# Patient Record
Sex: FEMALE | Race: WHITE | NOT HISPANIC OR LATINO
[De-identification: names, ages, dates, MRNs, and addresses within clinical notes are randomized per-mention and may not be internally consistent; named-entity substitution may affect disease eponyms.]

---

## 2018-12-04 ENCOUNTER — RESULT REVIEW (OUTPATIENT)
Age: 73
End: 2018-12-04

## 2018-12-10 ENCOUNTER — RESULT REVIEW (OUTPATIENT)
Age: 73
End: 2018-12-10

## 2018-12-14 ENCOUNTER — RECORD ABSTRACTING (OUTPATIENT)
Age: 73
End: 2018-12-14

## 2018-12-14 DIAGNOSIS — Z96.653 PRESENCE OF ARTIFICIAL KNEE JOINT, BILATERAL: ICD-10-CM

## 2018-12-14 DIAGNOSIS — Z83.49 FAMILY HISTORY OF OTHER ENDOCRINE, NUTRITIONAL AND METABOLIC DISEASES: ICD-10-CM

## 2018-12-14 DIAGNOSIS — F32.9 MAJOR DEPRESSIVE DISORDER, SINGLE EPISODE, UNSPECIFIED: ICD-10-CM

## 2018-12-14 DIAGNOSIS — Z86.19 PERSONAL HISTORY OF OTHER INFECTIOUS AND PARASITIC DISEASES: ICD-10-CM

## 2018-12-14 DIAGNOSIS — M19.041 PRIMARY OSTEOARTHRITIS, RIGHT HAND: ICD-10-CM

## 2018-12-14 DIAGNOSIS — R73.03 PREDIABETES.: ICD-10-CM

## 2018-12-14 DIAGNOSIS — Z87.898 PERSONAL HISTORY OF OTHER SPECIFIED CONDITIONS: ICD-10-CM

## 2018-12-14 DIAGNOSIS — Z81.8 FAMILY HISTORY OF OTHER MENTAL AND BEHAVIORAL DISORDERS: ICD-10-CM

## 2018-12-14 DIAGNOSIS — Z82.49 FAMILY HISTORY OF ISCHEMIC HEART DISEASE AND OTHER DISEASES OF THE CIRCULATORY SYSTEM: ICD-10-CM

## 2018-12-14 DIAGNOSIS — Z85.038 PERSONAL HISTORY OF OTHER MALIGNANT NEOPLASM OF LARGE INTESTINE: ICD-10-CM

## 2018-12-14 DIAGNOSIS — R25.2 CRAMP AND SPASM: ICD-10-CM

## 2018-12-14 DIAGNOSIS — M19.042 PRIMARY OSTEOARTHRITIS, LEFT HAND: ICD-10-CM

## 2018-12-14 DIAGNOSIS — Z84.89 FAMILY HISTORY OF OTHER SPECIFIED CONDITIONS: ICD-10-CM

## 2018-12-14 DIAGNOSIS — Z87.828 PERSONAL HISTORY OF OTHER (HEALED) PHYSICAL INJURY AND TRAUMA: ICD-10-CM

## 2018-12-14 DIAGNOSIS — Z82.0 FAMILY HISTORY OF EPILEPSY AND OTHER DISEASES OF THE NERVOUS SYSTEM: ICD-10-CM

## 2018-12-14 DIAGNOSIS — R68.89 OTHER GENERAL SYMPTOMS AND SIGNS: ICD-10-CM

## 2018-12-14 DIAGNOSIS — Z83.3 FAMILY HISTORY OF DIABETES MELLITUS: ICD-10-CM

## 2018-12-19 ENCOUNTER — APPOINTMENT (OUTPATIENT)
Dept: GASTROENTEROLOGY | Facility: HOSPITAL | Age: 73
End: 2018-12-19

## 2018-12-31 ENCOUNTER — RX RENEWAL (OUTPATIENT)
Age: 73
End: 2018-12-31

## 2019-01-09 ENCOUNTER — APPOINTMENT (OUTPATIENT)
Dept: ENDOCRINOLOGY | Facility: CLINIC | Age: 74
End: 2019-01-09
Payer: MEDICARE

## 2019-01-09 PROCEDURE — 36415 COLL VENOUS BLD VENIPUNCTURE: CPT

## 2019-01-10 ENCOUNTER — APPOINTMENT (OUTPATIENT)
Dept: FAMILY MEDICINE | Facility: CLINIC | Age: 74
End: 2019-01-10
Payer: MEDICARE

## 2019-01-10 VITALS
DIASTOLIC BLOOD PRESSURE: 64 MMHG | WEIGHT: 209 LBS | HEIGHT: 62 IN | BODY MASS INDEX: 38.46 KG/M2 | SYSTOLIC BLOOD PRESSURE: 112 MMHG

## 2019-01-10 DIAGNOSIS — R32 UNSPECIFIED URINARY INCONTINENCE: ICD-10-CM

## 2019-01-10 PROCEDURE — 99214 OFFICE O/P EST MOD 30 MIN: CPT

## 2019-01-10 NOTE — PLAN
[FreeTextEntry1] : The patient will see an urogynecologist for her new onset Urinary incontinence\par NPH was r/o by her neurologist and CT head\par Her cough responding to Benzonatate which was renewed\par Recent Gastroscopy with Dr Lynn and management of GERD as per GI\par Found with biliary lithiasis which I think is asymptomatic

## 2019-01-10 NOTE — PHYSICAL EXAM
[Normal Sclera/Conjunctiva] : normal sclera/conjunctiva [EOMI] : extraocular movements intact [Normal Outer Ear/Nose] : the outer ears and nose were normal in appearance [No JVD] : no jugular venous distention

## 2019-01-10 NOTE — HISTORY OF PRESENT ILLNESS
[FreeTextEntry1] : Urinary incontinence\par GERD worsening\par Cough\par Biliary ilithiasis [de-identified] : Reports severe Urinary incontinence without any premonitory signs\par A CT of the head r/o NPH\par Recently seen By Dr Lynn, GI and  at [facility] for GERD and RUQ pain.\par He was found also with biliary lithiasis

## 2019-01-10 NOTE — REVIEW OF SYSTEMS
[Nasal Discharge] : nasal discharge [Palpitations] : palpitations [Cough] : cough [Heartburn] : heartburn [Incontinence] : incontinence [Joint Pain] : joint pain [Negative] : Eyes [Chest Pain] : no chest pain [Orthopnea] : no orthopnea

## 2019-01-10 NOTE — HEALTH RISK ASSESSMENT
[Intercurrent ED visits] : went to ED [No falls in past year] : Patient reported no falls in the past year [1] : 1) Little interest or pleasure doing things for several days (1) [0] : 2) Feeling down, depressed, or hopeless: Not at all (0) [] : No [de-identified] : [facility] [de-identified] : GI [[id number]] : 1

## 2019-01-18 ENCOUNTER — APPOINTMENT (OUTPATIENT)
Dept: ENDOCRINOLOGY | Facility: CLINIC | Age: 74
End: 2019-01-18
Payer: MEDICARE

## 2019-01-18 VITALS
HEART RATE: 84 BPM | BODY MASS INDEX: 38.28 KG/M2 | HEIGHT: 62 IN | SYSTOLIC BLOOD PRESSURE: 110 MMHG | DIASTOLIC BLOOD PRESSURE: 62 MMHG | WEIGHT: 208 LBS

## 2019-01-18 LAB
25(OH)D3 SERPL-MCNC: 35.4 NG/ML
ALBUMIN SERPL ELPH-MCNC: 4.3 G/DL
ALP BLD-CCNC: 135 U/L
ALT SERPL-CCNC: 10 U/L
ANION GAP SERPL CALC-SCNC: 12 MMOL/L
AST SERPL-CCNC: 16 U/L
BASOPHILS # BLD AUTO: 0.03 K/UL
BASOPHILS NFR BLD AUTO: 0.4 %
BILIRUB SERPL-MCNC: 0.3 MG/DL
BUN SERPL-MCNC: 20 MG/DL
CALCIUM SERPL-MCNC: 9.7 MG/DL
CHLORIDE SERPL-SCNC: 107 MMOL/L
CHOLEST SERPL-MCNC: 221 MG/DL
CHOLEST/HDLC SERPL: 2.9 RATIO
CO2 SERPL-SCNC: 25 MMOL/L
CREAT SERPL-MCNC: 1.32 MG/DL
EOSINOPHIL # BLD AUTO: 0.12 K/UL
EOSINOPHIL NFR BLD AUTO: 1.6 %
FERRITIN SERPL-MCNC: 61 NG/ML
GLUCOSE SERPL-MCNC: 109 MG/DL
HCT VFR BLD CALC: 41.5 %
HDLC SERPL-MCNC: 76 MG/DL
HGB BLD-MCNC: 12.6 G/DL
IMM GRANULOCYTES NFR BLD AUTO: 0.3 %
IRON SERPL-MCNC: 27 UG/DL
LDLC SERPL CALC-MCNC: 119 MG/DL
LYMPHOCYTES # BLD AUTO: 1.53 K/UL
LYMPHOCYTES NFR BLD AUTO: 19.8 %
MAN DIFF?: NORMAL
MCHC RBC-ENTMCNC: 27 PG
MCHC RBC-ENTMCNC: 30.4 GM/DL
MCV RBC AUTO: 88.9 FL
MONOCYTES # BLD AUTO: 0.61 K/UL
MONOCYTES NFR BLD AUTO: 7.9 %
NEUTROPHILS # BLD AUTO: 5.43 K/UL
NEUTROPHILS NFR BLD AUTO: 70 %
PLATELET # BLD AUTO: 183 K/UL
POTASSIUM SERPL-SCNC: 4.9 MMOL/L
PROT SERPL-MCNC: 6.9 G/DL
RBC # BLD: 4.67 M/UL
RBC # FLD: 14.1 %
SODIUM SERPL-SCNC: 144 MMOL/L
T4 FREE SERPL-MCNC: 1.2 NG/DL
TRIGL SERPL-MCNC: 131 MG/DL
TSH SERPL-ACNC: 6.35 UIU/ML
WBC # FLD AUTO: 7.74 K/UL

## 2019-01-18 PROCEDURE — 99213 OFFICE O/P EST LOW 20 MIN: CPT

## 2019-01-18 RX ORDER — DEXLANSOPRAZOLE 60 MG/1
60 CAPSULE, DELAYED RELEASE ORAL
Refills: 0 | Status: DISCONTINUED | COMMUNITY
End: 2019-01-18

## 2019-01-18 RX ORDER — HYDROXYCHLOROQUINE SULFATE 200 MG/1
200 TABLET, FILM COATED ORAL
Refills: 0 | Status: DISCONTINUED | COMMUNITY
End: 2019-01-18

## 2019-01-18 RX ORDER — BENZONATATE 100 MG/1
100 CAPSULE ORAL 3 TIMES DAILY
Qty: 21 | Refills: 0 | Status: DISCONTINUED | COMMUNITY
Start: 2019-01-10 | End: 2019-01-18

## 2019-01-31 ENCOUNTER — APPOINTMENT (OUTPATIENT)
Dept: RHEUMATOLOGY | Facility: CLINIC | Age: 74
End: 2019-01-31
Payer: MEDICARE

## 2019-01-31 VITALS
HEART RATE: 61 BPM | BODY MASS INDEX: 38.28 KG/M2 | HEIGHT: 62 IN | SYSTOLIC BLOOD PRESSURE: 114 MMHG | WEIGHT: 208 LBS | OXYGEN SATURATION: 97 % | DIASTOLIC BLOOD PRESSURE: 64 MMHG | RESPIRATION RATE: 16 BRPM

## 2019-01-31 DIAGNOSIS — I50.30 UNSPECIFIED DIASTOLIC (CONGESTIVE) HEART FAILURE: ICD-10-CM

## 2019-01-31 DIAGNOSIS — Z87.19 PERSONAL HISTORY OF OTHER DISEASES OF THE DIGESTIVE SYSTEM: ICD-10-CM

## 2019-01-31 PROCEDURE — 99214 OFFICE O/P EST MOD 30 MIN: CPT

## 2019-01-31 RX ORDER — RANITIDINE HYDROCHLORIDE 150 MG/1
150 TABLET, FILM COATED ORAL
Refills: 0 | Status: DISCONTINUED | COMMUNITY
End: 2019-01-31

## 2019-01-31 NOTE — PHYSICAL EXAM
[] : no respiratory distress [Auscultation Breath Sounds / Voice Sounds] : lungs were clear to auscultation bilaterally [Cervical Lymph Nodes Enlarged Posterior Bilaterally] : posterior cervical [Cervical Lymph Nodes Enlarged Anterior Bilaterally] : anterior cervical [Motor Exam] : the motor exam was normal [FreeTextEntry1] : No evidence of active synovitis at this time.

## 2019-01-31 NOTE — REVIEW OF SYSTEMS
[Fever] : no fever [Chills] : no chills [Eye Pain] : no eye pain [Red Eyes] : eyes not red [Shortness Of Breath] : no shortness of breath [Cough] : no cough [FreeTextEntry6] : No pleuritic C/P

## 2019-02-25 ENCOUNTER — APPOINTMENT (OUTPATIENT)
Dept: GASTROENTEROLOGY | Facility: CLINIC | Age: 74
End: 2019-02-25
Payer: MEDICARE

## 2019-02-25 VITALS
WEIGHT: 205 LBS | BODY MASS INDEX: 37.73 KG/M2 | DIASTOLIC BLOOD PRESSURE: 73 MMHG | SYSTOLIC BLOOD PRESSURE: 136 MMHG | HEIGHT: 62 IN

## 2019-02-25 PROCEDURE — 99214 OFFICE O/P EST MOD 30 MIN: CPT

## 2019-02-25 NOTE — ASSESSMENT
[FreeTextEntry1] : 1.  the symptoms, including the bitter lemy taste, worse upon lying down at night, and the water brash, all seem correlated with when Laisha went off Dexilant, and on Pantoprazole.\par 2.  I have explained that Dexilant in some patient s with reflux appears to be more successful in controlling the symptoms\par 3.  I have also noted that perhaps we can get things under control with Dexilant, 60 mg per day..and then we will change perhaps in the future to every other day\par 4.  for now however, this is about achieving good control\par 5. patient has strong fh of colon ca, and there is also fh os esophageal cancer [grandfather Dario]\par 6.  later in the year, we will be arranging colonos and egd..egd was in 2017, but the last colon was may 2018.\par 7rv in three months

## 2019-02-25 NOTE — HISTORY OF PRESENT ILLNESS
[FreeTextEntry1] : patient, who is well known to me from previous evals, has presented for recent onset some two months ago of a constant sensation of a bitter, sour, lemon like taste.\par seems to have lost her taste buds as well, all in th3e same time frame.\par no heartburn, no dysphagia.\par Please note; approx on year ago, last May, matthew t a year, I had discussed with the patient trying to wean down the Dexilant dose, although Dexilant had kept her reflux symptoms under superb control\par indeed, she never had this issue with her taste, and no problem with her taste buds.\par When she lies in bed she feels a type of water brash.\par \par patient about two mos ago, had gone completely off dexilant, and it was around then that she changed from Dexilant to Pantoprazole forty mg

## 2019-03-04 ENCOUNTER — RX RENEWAL (OUTPATIENT)
Age: 74
End: 2019-03-04

## 2019-03-13 NOTE — HISTORY OF PRESENT ILLNESS
[FreeTextEntry1] :  73 yo WW from [facility] coming for f/u hypothyroidism, strong family history of thyroid problems\par        hyperlipidemia, stopped Pravastatin as she had memory problems with it\par        seen Dr Norberto Reynolds for memory problems \par        also Dr Oneil for HTN\par        c/o weight gain and fatigue\par        likes cheese\par        fatigue started 3-6 months ago \par        9/16 had neck laminectomy, had infection and allergy to the suture material , has had multiple herniated disks\par        has been on thyroid supplement since her 20's , \par        Synthroid 112mcg TANNER qd labs reviewed normal TFTs\par        labs reviewed higher LDL \par        had thyroid US 1980's was fine, repeated 2011 small thyroid, no nodules\par        denies FHx thyroid cancer\par        had forehead radiation 1948 for a skin lesion\par        has on and off dysphagia\par        had EGD Dr Lynn \par        has no dysphonia\par        denies dyspnea\par        thyroid US 2011 small thyroid no nodules\par        labs reviewed high normal thyroid, finally low normal Vit D was low prior\par        had no CBCsince 4/17, does not want to have it today,, has h/o prior anemia per pt\par \par thyroid US 2011 MPRESSION: No focal nodules. Small heterogeneous thyroid gland similar to prior sonogram consistent with chronic thyroiditis.\par dexa scan 2011 normal \par has chronic fatigue sees also Cardiology

## 2019-03-13 NOTE — REVIEW OF SYSTEMS
[Fatigue] : fatigue [Recent Weight Loss (___ Lbs)] : recent [unfilled] ~Ulb weight loss [Joint Pain] : joint pain [Muscle Cramps] : muscle cramps [Myalgia] : myalgia  [Back Pain] : back pain [Depression] : depression [Anxiety] : anxiety [Negative] : Heme/Lymph [Recent Weight Gain (___ Lbs)] : no recent weight gain

## 2019-03-15 ENCOUNTER — RX RENEWAL (OUTPATIENT)
Age: 74
End: 2019-03-15

## 2019-04-01 ENCOUNTER — APPOINTMENT (OUTPATIENT)
Dept: CARDIOLOGY | Facility: CLINIC | Age: 74
End: 2019-04-01
Payer: MEDICARE

## 2019-04-01 ENCOUNTER — NON-APPOINTMENT (OUTPATIENT)
Age: 74
End: 2019-04-01

## 2019-04-01 VITALS
SYSTOLIC BLOOD PRESSURE: 140 MMHG | WEIGHT: 210 LBS | BODY MASS INDEX: 38.64 KG/M2 | HEART RATE: 55 BPM | DIASTOLIC BLOOD PRESSURE: 78 MMHG | HEIGHT: 62 IN

## 2019-04-01 PROCEDURE — 93000 ELECTROCARDIOGRAM COMPLETE: CPT

## 2019-04-01 PROCEDURE — 99214 OFFICE O/P EST MOD 30 MIN: CPT

## 2019-04-01 RX ORDER — ALBUTEROL SULFATE 90 UG/1
108 AEROSOL, METERED RESPIRATORY (INHALATION)
Refills: 0 | Status: DISCONTINUED | COMMUNITY
End: 2019-04-01

## 2019-04-01 RX ORDER — PANTOPRAZOLE 40 MG/1
40 TABLET, DELAYED RELEASE ORAL
Refills: 0 | Status: DISCONTINUED | COMMUNITY
End: 2019-04-01

## 2019-04-01 NOTE — REASON FOR VISIT
[FreeTextEntry1] : The patient was followed with principal diagnoses of hyperlipidemia, previous history of shortness of breath, nonobstructive CAD. She is scheduled for elective right hip replacement surgery with Dr. DURON  on April 23 at [facility].

## 2019-04-01 NOTE — HISTORY OF PRESENT ILLNESS
[FreeTextEntry1] : The patient has had right hip pain continued to her osteoarthritis. However there have been no acute cardiac symptoms. The patient has had no symptoms of chest pain shortness of breath dizziness lightheadedness or palpitations. She remains quite active but without formal exercise.

## 2019-04-01 NOTE — PHYSICAL EXAM
[General Appearance - Well Developed] : well developed [Normal Appearance] : normal appearance [Well Groomed] : well groomed [General Appearance - Well Nourished] : well nourished [No Deformities] : no deformities [General Appearance - In No Acute Distress] : no acute distress [Normal Conjunctiva] : the conjunctiva exhibited no abnormalities [Eyelids - No Xanthelasma] : the eyelids demonstrated no xanthelasmas [Normal Oral Mucosa] : normal oral mucosa [No Oral Pallor] : no oral pallor [No Oral Cyanosis] : no oral cyanosis [Normal Jugular Venous A Waves Present] : normal jugular venous A waves present [Normal Jugular Venous V Waves Present] : normal jugular venous V waves present [No Jugular Venous Duenas A Waves] : no jugular venous duenas A waves [Respiration, Rhythm And Depth] : normal respiratory rhythm and effort [Exaggerated Use Of Accessory Muscles For Inspiration] : no accessory muscle use [Auscultation Breath Sounds / Voice Sounds] : lungs were clear to auscultation bilaterally [Heart Rate And Rhythm] : heart rate and rhythm were normal [Heart Sounds] : normal S1 and S2 [Murmurs] : no murmurs present [Abdomen Soft] : soft [Abdomen Tenderness] : non-tender [Abdomen Mass (___ Cm)] : no abdominal mass palpated [Abnormal Walk] : normal gait [Gait - Sufficient For Exercise Testing] : the gait was sufficient for exercise testing [Nail Clubbing] : no clubbing of the fingernails [Cyanosis, Localized] : no localized cyanosis [Petechial Hemorrhages (___cm)] : no petechial hemorrhages [Skin Color & Pigmentation] : normal skin color and pigmentation [] : no rash [No Venous Stasis] : no venous stasis [Skin Lesions] : no skin lesions [No Skin Ulcers] : no skin ulcer [No Xanthoma] : no  xanthoma was observed [Oriented To Time, Place, And Person] : oriented to person, place, and time [Affect] : the affect was normal [Mood] : the mood was normal [No Anxiety] : not feeling anxious

## 2019-04-01 NOTE — DISCUSSION/SUMMARY
[FreeTextEntry1] : Patient is scheduled for elective hip replacement on April 23. Her cardiac examination and evaluation today is stable and satisfactory. The patient has had no recent cardiac symptoms. I see no reason that the scheduled surgery should not go forward as planned. The patient has been advised to take her medication especially her olmesartan on the morning of surgery so as to avoid any spike in blood pressure. I don't feel that any further cardiac tests are required at this time. The electrocardiogram is normal.

## 2019-04-08 ENCOUNTER — LABORATORY RESULT (OUTPATIENT)
Age: 74
End: 2019-04-08

## 2019-04-11 ENCOUNTER — APPOINTMENT (OUTPATIENT)
Dept: FAMILY MEDICINE | Facility: CLINIC | Age: 74
End: 2019-04-11
Payer: MEDICARE

## 2019-04-11 VITALS
SYSTOLIC BLOOD PRESSURE: 132 MMHG | HEART RATE: 56 BPM | DIASTOLIC BLOOD PRESSURE: 78 MMHG | BODY MASS INDEX: 38.64 KG/M2 | HEIGHT: 62 IN | RESPIRATION RATE: 14 BRPM | WEIGHT: 210 LBS

## 2019-04-11 DIAGNOSIS — Z01.818 ENCOUNTER FOR OTHER PREPROCEDURAL EXAMINATION: ICD-10-CM

## 2019-04-11 DIAGNOSIS — M16.11 UNILATERAL PRIMARY OSTEOARTHRITIS, RIGHT HIP: ICD-10-CM

## 2019-04-11 PROCEDURE — 99215 OFFICE O/P EST HI 40 MIN: CPT

## 2019-04-11 NOTE — PLAN
[FreeTextEntry1] : Pre-op investigation done at [facility] reviewed\par All the investigation are in normal range\par The patient will have cardiac clearance with Dr Oneil\par The patient is medically stable and cleared for surgery\par The patient is medically stable and cleared for surgery

## 2019-04-11 NOTE — PHYSICAL EXAM
[No Acute Distress] : no acute distress [Well Nourished] : well nourished [Well Developed] : well developed [Well-Appearing] : well-appearing [Normal Sclera/Conjunctiva] : normal sclera/conjunctiva [PERRL] : pupils equal round and reactive to light [EOMI] : extraocular movements intact [Normal Outer Ear/Nose] : the outer ears and nose were normal in appearance [Normal Oropharynx] : the oropharynx was normal [Supple] : supple [No JVD] : no jugular venous distention [No Lymphadenopathy] : no lymphadenopathy [Thyroid Normal, No Nodules] : the thyroid was normal and there were no nodules present [No Respiratory Distress] : no respiratory distress  [Clear to Auscultation] : lungs were clear to auscultation bilaterally [No Accessory Muscle Use] : no accessory muscle use [Normal Rate] : normal rate  [Regular Rhythm] : with a regular rhythm [Normal S1, S2] : normal S1 and S2 [No Abdominal Bruit] : a ~M bruit was not heard ~T in the abdomen [No Murmur] : no murmur heard [No Carotid Bruits] : no carotid bruits [Pedal Pulses Present] : the pedal pulses are present [No Varicosities] : no varicosities [No Edema] : there was no peripheral edema [No Extremity Clubbing/Cyanosis] : no extremity clubbing/cyanosis [No Palpable Aorta] : no palpable aorta [Soft] : abdomen soft [Non Tender] : non-tender [No Masses] : no abdominal mass palpated [Non-distended] : non-distended [Normal Bowel Sounds] : normal bowel sounds [No HSM] : no HSM [Normal Posterior Cervical Nodes] : no posterior cervical lymphadenopathy [Normal Anterior Cervical Nodes] : no anterior cervical lymphadenopathy [No CVA Tenderness] : no CVA  tenderness [No Joint Swelling] : no joint swelling [No Spinal Tenderness] : no spinal tenderness [Grossly Normal Strength/Tone] : grossly normal strength/tone [No Rash] : no rash [Normal Gait] : normal gait [Coordination Grossly Intact] : coordination grossly intact [No Focal Deficits] : no focal deficits [Deep Tendon Reflexes (DTR)] : deep tendon reflexes were 2+ and symmetric [Normal Affect] : the affect was normal [Normal Insight/Judgement] : insight and judgment were intact [de-identified] : P

## 2019-04-11 NOTE — HISTORY OF PRESENT ILLNESS
[Coronary Artery Disease] : coronary artery disease [Asthma] : asthma [No Adverse Anesthesia Reaction] : no adverse anesthesia reaction in self or family member [(Patient denies any chest pain, claudication, dyspnea on exertion, orthopnea, palpitations or syncope)] : Patient denies any chest pain, claudication, dyspnea on exertion, orthopnea, palpitations or syncope [Family Member] : no family member with adverse anesthesia reaction/sudden death [Self] : no previous adverse anesthesia reaction [Chronic Anticoagulation] : no chronic anticoagulation [Chronic Kidney Disease] : no chronic kidney disease [Diabetes] : no diabetes [FreeTextEntry1] : Right total hip replacement [FreeTextEntry2] : 04/23/2019 [FreeTextEntry3] : Dr Michael Bailey [FreeTextEntry4] : The patient is known with osteoarthritis\par scheduled for elective right hip replacement\par tolerated well previous anesthesia and surgery [FreeTextEntry7] : The patient will have cardiac clearance

## 2019-04-23 ENCOUNTER — APPOINTMENT (OUTPATIENT)
Dept: CARDIOLOGY | Facility: CLINIC | Age: 74
End: 2019-04-23

## 2019-05-28 ENCOUNTER — APPOINTMENT (OUTPATIENT)
Dept: ENDOCRINOLOGY | Facility: CLINIC | Age: 74
End: 2019-05-28
Payer: MEDICARE

## 2019-05-28 VITALS
WEIGHT: 207 LBS | HEIGHT: 62 IN | HEART RATE: 62 BPM | DIASTOLIC BLOOD PRESSURE: 72 MMHG | BODY MASS INDEX: 38.09 KG/M2 | SYSTOLIC BLOOD PRESSURE: 114 MMHG

## 2019-05-28 PROCEDURE — 99214 OFFICE O/P EST MOD 30 MIN: CPT

## 2019-05-28 NOTE — PHYSICAL EXAM
[Alert] : alert [No Acute Distress] : no acute distress [Well Nourished] : well nourished [Normal Sclera/Conjunctiva] : normal sclera/conjunctiva [Well Developed] : well developed [No Proptosis] : no proptosis [EOMI] : extra ocular movement intact [Thyroid Not Enlarged] : the thyroid was not enlarged [Normal Oropharynx] : the oropharynx was normal [No Thyroid Nodules] : there were no palpable thyroid nodules [No Respiratory Distress] : no respiratory distress [No Accessory Muscle Use] : no accessory muscle use [Clear to Auscultation] : lungs were clear to auscultation bilaterally [Normal Rate] : heart rate was normal  [Normal S1, S2] : normal S1 and S2 [Regular Rhythm] : with a regular rhythm [Pedal Pulses Normal] : the pedal pulses are present [No Edema] : there was no peripheral edema [Normal Bowel Sounds] : normal bowel sounds [Not Tender] : non-tender [Soft] : abdomen soft [Not Distended] : not distended [Post Cervical Nodes] : posterior cervical nodes [Anterior Cervical Nodes] : anterior cervical nodes [Axillary Nodes] : axillary nodes [No Spinal Tenderness] : no spinal tenderness [Normal] : normal and non tender [Spine Straight] : spine straight [No Stigmata of Cushings Syndrome] : no stigmata of cushings syndrome [Normal Gait] : normal gait [Normal Strength/Tone] : muscle strength and tone were normal [No Rash] : no rash [Acanthosis Nigricans] : no acanthosis nigricans [No Tremors] : no tremors [Normal Reflexes] : deep tendon reflexes were 2+ and symmetric [Oriented x3] : oriented to person, place, and time

## 2019-05-28 NOTE — HISTORY OF PRESENT ILLNESS
[FreeTextEntry1] :  73 yo WW from [facility] coming for f/u hypothyroidism, strong family history of thyroid problems\par        hyperlipidemia, stopped Pravastatin as she had memory problems with it\par        seen Dr Norberto Reynolds for memory problems \par        also Dr Oneil for HTN\par        feels better since started gardening \par no  more fatigue and lost 3lb since last visit \par recent right hip replacement feels better can move more \par        9/16 had neck laminectomy, had infection and allergy to the suture material , has had multiple herniated disks\par        has been on thyroid supplement since her 20's , \par        Synthroid 112mcg TANNER qd labs reviewed normal TFTs\par        labs reviewed higher LDL \par        had thyroid US 1980's was fine, repeated 2011 small thyroid, no nodules\par        denies FHx thyroid cancer\par        had forehead radiation 1948 for a skin lesion\par        has on and off dysphagia\par        had EGD Dr Lynn \par        has no dysphonia\par        denies dyspnea\par        thyroid US 2011 small thyroid no nodules\par        labs reviewed high normal thyroid, finally low normal Vit D was low prior\par        had no CBCsince 4/17, does not want to have it today,, has h/o prior anemia per pt\par \par thyroid US 2011 MPRESSION: No focal nodules. Small heterogeneous thyroid gland similar to prior sonogram consistent with chronic thyroiditis.\par dexa scan 2011 normal \par has chronic fatigue sees also Cardiology

## 2019-05-28 NOTE — REVIEW OF SYSTEMS
[Fatigue] : no fatigue [Recent Weight Gain (___ Lbs)] : no recent weight gain [Recent Weight Loss (___ Lbs)] : recent [unfilled] ~Ulb weight loss [Joint Pain] : joint pain [Muscle Cramps] : muscle cramps [Myalgia] : myalgia  [Back Pain] : back pain [Depression] : depression [Anxiety] : anxiety [Negative] : Heme/Lymph

## 2019-05-30 ENCOUNTER — APPOINTMENT (OUTPATIENT)
Dept: GASTROENTEROLOGY | Facility: CLINIC | Age: 74
End: 2019-05-30
Payer: MEDICARE

## 2019-05-30 VITALS
SYSTOLIC BLOOD PRESSURE: 160 MMHG | HEART RATE: 69 BPM | HEIGHT: 62 IN | DIASTOLIC BLOOD PRESSURE: 81 MMHG | WEIGHT: 206 LBS | BODY MASS INDEX: 37.91 KG/M2

## 2019-05-30 PROCEDURE — 99214 OFFICE O/P EST MOD 30 MIN: CPT

## 2019-05-30 NOTE — HISTORY OF PRESENT ILLNESS
[FreeTextEntry1] : Problems #1 intractable esophageal reflux.\par \par The patient is having what we could not describe as intractable reflux. She continues yareli her regimen of Paxil and 60 mg per day which she has been on for at least five years.\par \par in spite of this treatment, she continues to be highly symptomatic.\par \par there is virtually constant regurgitation, generally after she eats or drinks something..\par it does not matter what she consumes..\par even when she is sitting or walking, she feels the regurgitation.\par \par for instance, today, even when she came to office appointment, she drank bottle of water on her way over, then got out of the car, and regurgitated it up four times.\par \par doesn’t get much over night.\par \par upon bending over, not a problem\par \par her granfather had ca of esophagus, in seventies.\par \par \par after the last egd, she ended up with 'sores' on back of her rebecca, soon afterwards\par \par perhaps some of the symptamatology is belching..\par interestingly, while there is frequent regurg., the patient does not feel true heartburn\par \par also, patient has had hip replacement one month ago\par \par

## 2019-05-30 NOTE — ASSESSMENT
[FreeTextEntry1] : 1.  patient continues to be very symptomatic from regurgitation\par \par 2.  what is somewhat unusual is the absence of true heartburn,; there is regurgit, but not really discomfort, not really burning, not really positional, not waking her out of sleep, and not actually precipitated by any particular foods\par \par 3.  there has been a loss of 'taste buds' which she attributes to the time of her last procedure\par \par 4.  for sure, with the atypical nature of Cecelias symptoms, and I must reiterate, they are not completely typical, , and not at all food assoiated, we need to be cautious before setting up surgery\par \par 5. we discussed having a Bravo test; the 24 hour test to monitor ph but the downside being it actually involves Endoscopy to pass the probe into the esophagus.\par \par 6.  we have decided to try adding Gaviscon, and I would take two Gaviscon with each meal, and even three..the best form of Gaviscon is the tablet..\par I actually advise whenever possible GAVISCON ADVANCE, WHICH REQUIREWS INTERNET PURCHASE FROM [employer], BUT HAS HIGHER CONTENT OF ALGINISIC ACID, AND IS CLEARLY SUPERIOR TO OUR GAVISCON\par 7.  small meals, also advised\par \par and rv in three months\par and take the ranitidine or famotidine or zantac or pepcid on a prn basis, with the Gaviscon\par \par More than 50% of the face to face time was devoted to counseling and /or coordination of care.  THis coordination of care may have included reviewing other medical notes and reports, and communicating with other health professionals\par

## 2019-07-19 ENCOUNTER — RX RENEWAL (OUTPATIENT)
Age: 74
End: 2019-07-19

## 2019-07-25 ENCOUNTER — APPOINTMENT (OUTPATIENT)
Dept: RHEUMATOLOGY | Facility: CLINIC | Age: 74
End: 2019-07-25

## 2019-09-12 ENCOUNTER — LABORATORY RESULT (OUTPATIENT)
Age: 74
End: 2019-09-12

## 2019-09-13 ENCOUNTER — APPOINTMENT (OUTPATIENT)
Dept: ENDOCRINOLOGY | Facility: CLINIC | Age: 74
End: 2019-09-13
Payer: MEDICARE

## 2019-09-13 VITALS
BODY MASS INDEX: 36.07 KG/M2 | HEART RATE: 92 BPM | SYSTOLIC BLOOD PRESSURE: 100 MMHG | WEIGHT: 196 LBS | RESPIRATION RATE: 16 BRPM | DIASTOLIC BLOOD PRESSURE: 62 MMHG | HEIGHT: 62 IN | OXYGEN SATURATION: 100 %

## 2019-09-13 PROCEDURE — 99214 OFFICE O/P EST MOD 30 MIN: CPT

## 2019-09-13 RX ORDER — HYDROXYCHLOROQUINE SULFATE 200 MG/1
200 TABLET, FILM COATED ORAL
Qty: 60 | Refills: 2 | Status: DISCONTINUED | COMMUNITY
Start: 2019-03-15 | End: 2019-09-13

## 2019-09-15 NOTE — PHYSICAL EXAM
[Alert] : alert [No Acute Distress] : no acute distress [Well Nourished] : well nourished [Well Developed] : well developed [Normal Sclera/Conjunctiva] : normal sclera/conjunctiva [No Proptosis] : no proptosis [EOMI] : extra ocular movement intact [Normal Oropharynx] : the oropharynx was normal [Thyroid Not Enlarged] : the thyroid was not enlarged [No Thyroid Nodules] : there were no palpable thyroid nodules [No Respiratory Distress] : no respiratory distress [No Accessory Muscle Use] : no accessory muscle use [Clear to Auscultation] : lungs were clear to auscultation bilaterally [Normal Rate] : heart rate was normal  [Normal S1, S2] : normal S1 and S2 [Pedal Pulses Normal] : the pedal pulses are present [Regular Rhythm] : with a regular rhythm [No Edema] : there was no peripheral edema [Normal Bowel Sounds] : normal bowel sounds [Not Tender] : non-tender [Not Distended] : not distended [Soft] : abdomen soft [Post Cervical Nodes] : posterior cervical nodes [Anterior Cervical Nodes] : anterior cervical nodes [Axillary Nodes] : axillary nodes [Normal] : normal and non tender [No Spinal Tenderness] : no spinal tenderness [Spine Straight] : spine straight [No Stigmata of Cushings Syndrome] : no stigmata of cushings syndrome [Normal Gait] : normal gait [Normal Strength/Tone] : muscle strength and tone were normal [Acanthosis Nigricans] : no acanthosis nigricans [No Rash] : no rash [Normal Reflexes] : deep tendon reflexes were 2+ and symmetric [No Tremors] : no tremors [Oriented x3] : oriented to person, place, and time

## 2019-09-15 NOTE — REVIEW OF SYSTEMS
[Fatigue] : no fatigue [Recent Weight Gain (___ Lbs)] : no recent weight gain [Recent Weight Loss (___ Lbs)] : recent [unfilled] ~Ulb weight loss [Muscle Cramps] : muscle cramps [Joint Pain] : joint pain [Back Pain] : back pain [Myalgia] : myalgia  [Depression] : depression [Anxiety] : anxiety [Negative] : Heme/Lymph

## 2019-09-26 ENCOUNTER — APPOINTMENT (OUTPATIENT)
Dept: OBGYN | Facility: CLINIC | Age: 74
End: 2019-09-26
Payer: MEDICARE

## 2019-10-08 PROBLEM — Z80.0 FAMILY HISTORY OF MALIGNANT NEOPLASM OF COLON: Status: ACTIVE | Noted: 2018-12-14

## 2019-10-10 ENCOUNTER — APPOINTMENT (OUTPATIENT)
Dept: OBGYN | Facility: CLINIC | Age: 74
End: 2019-10-10
Payer: MEDICARE

## 2019-10-10 VITALS
BODY MASS INDEX: 38.09 KG/M2 | DIASTOLIC BLOOD PRESSURE: 80 MMHG | WEIGHT: 194 LBS | HEIGHT: 60 IN | SYSTOLIC BLOOD PRESSURE: 118 MMHG

## 2019-10-10 DIAGNOSIS — Z80.0 FAMILY HISTORY OF MALIGNANT NEOPLASM OF DIGESTIVE ORGANS: ICD-10-CM

## 2019-10-10 PROCEDURE — G0101: CPT

## 2019-10-14 ENCOUNTER — APPOINTMENT (OUTPATIENT)
Dept: CARDIOLOGY | Facility: CLINIC | Age: 74
End: 2019-10-14
Payer: MEDICARE

## 2019-10-14 VITALS
BODY MASS INDEX: 37.69 KG/M2 | DIASTOLIC BLOOD PRESSURE: 80 MMHG | HEIGHT: 60 IN | WEIGHT: 192 LBS | SYSTOLIC BLOOD PRESSURE: 130 MMHG | HEART RATE: 66 BPM

## 2019-10-14 PROCEDURE — 99213 OFFICE O/P EST LOW 20 MIN: CPT

## 2019-10-14 PROCEDURE — 93000 ELECTROCARDIOGRAM COMPLETE: CPT

## 2019-10-14 NOTE — REASON FOR VISIT
[FreeTextEntry1] : Patient comes for followup today. She is followed in a preventive mode with a history of hypertension and hyperlipidemia. She\par Successful hip surgery on the right in May 23 of this year. The patient did well with no cardiac complications.

## 2019-10-14 NOTE — DISCUSSION/SUMMARY
[FreeTextEntry1] : Patient is doing well clinically. There have been no acute cardiac symptoms. Her examination is stable. Electrocardiogram is normal. Based on today's examination I find her cardiovascular status to be satisfactory. I am urging the patient now to begin a program of regular walking exercise. I believe now that she is able to do this with the repaired hip as well as her general condition. Current medications will be continued.The patient's main medications are on losartan 20 mg and rosuvastatin 5 mg. Should be noted that the patient decreased her dosage of her simvastatin 10 mg because she believed it was causing memory loss. This has resolved with the lower dose.

## 2019-10-14 NOTE — PHYSICAL EXAM
[Well Groomed] : well groomed [Normal Appearance] : normal appearance [General Appearance - Well Developed] : well developed [General Appearance - Well Nourished] : well nourished [No Deformities] : no deformities [Normal Conjunctiva] : the conjunctiva exhibited no abnormalities [Eyelids - No Xanthelasma] : the eyelids demonstrated no xanthelasmas [General Appearance - In No Acute Distress] : no acute distress [No Oral Cyanosis] : no oral cyanosis [No Oral Pallor] : no oral pallor [Normal Oral Mucosa] : normal oral mucosa [Normal Jugular Venous A Waves Present] : normal jugular venous A waves present [Normal Jugular Venous V Waves Present] : normal jugular venous V waves present [No Jugular Venous Duenas A Waves] : no jugular venous duenas A waves [Respiration, Rhythm And Depth] : normal respiratory rhythm and effort [Heart Rate And Rhythm] : heart rate and rhythm were normal [Exaggerated Use Of Accessory Muscles For Inspiration] : no accessory muscle use [Auscultation Breath Sounds / Voice Sounds] : lungs were clear to auscultation bilaterally [Heart Sounds] : normal S1 and S2 [Murmurs] : no murmurs present [Abdomen Tenderness] : non-tender [Abdomen Soft] : soft [Abdomen Mass (___ Cm)] : no abdominal mass palpated [Abnormal Walk] : normal gait [Nail Clubbing] : no clubbing of the fingernails [Gait - Sufficient For Exercise Testing] : the gait was sufficient for exercise testing [Cyanosis, Localized] : no localized cyanosis [Petechial Hemorrhages (___cm)] : no petechial hemorrhages [Skin Color & Pigmentation] : normal skin color and pigmentation [] : no rash [Skin Lesions] : no skin lesions [No Skin Ulcers] : no skin ulcer [No Venous Stasis] : no venous stasis [Oriented To Time, Place, And Person] : oriented to person, place, and time [No Xanthoma] : no  xanthoma was observed [Mood] : the mood was normal [Affect] : the affect was normal [No Anxiety] : not feeling anxious

## 2019-10-21 ENCOUNTER — APPOINTMENT (OUTPATIENT)
Dept: FAMILY MEDICINE | Facility: CLINIC | Age: 74
End: 2019-10-21
Payer: MEDICARE

## 2019-10-21 VITALS
DIASTOLIC BLOOD PRESSURE: 78 MMHG | RESPIRATION RATE: 16 BRPM | SYSTOLIC BLOOD PRESSURE: 142 MMHG | HEIGHT: 60 IN | OXYGEN SATURATION: 97 % | BODY MASS INDEX: 38.09 KG/M2 | WEIGHT: 194 LBS | HEART RATE: 61 BPM

## 2019-10-21 PROCEDURE — 99214 OFFICE O/P EST MOD 30 MIN: CPT

## 2019-10-21 NOTE — HEALTH RISK ASSESSMENT
[] : No [Yes] : Yes [Monthly or less (1 pt)] : Monthly or less (1 point) [No falls in past year] : Patient reported no falls in the past year [Audit-CScore] : 1 [0] : 2) Feeling down, depressed, or hopeless: Not at all (0) [[id number]] : 0

## 2019-10-21 NOTE — HISTORY OF PRESENT ILLNESS
[FreeTextEntry1] : Cognitive changes\par F/u chronic medical problrems [de-identified] : Started on Aricept\par Report improvement in cognitive status

## 2019-10-21 NOTE — PLAN
[FreeTextEntry1] : Overall chronic condition are stable\par Improved cognitive status (per patient)\par Will  need Blood work next visit\par recommendedto drink Alkaline water

## 2019-10-21 NOTE — REVIEW OF SYSTEMS
[Incontinence] : incontinence [Joint Pain] : joint pain [Joint Stiffness] : joint stiffness [Back Pain] : back pain [Memory Loss] : memory loss [Negative] : Psychiatric

## 2019-11-04 ENCOUNTER — RX RENEWAL (OUTPATIENT)
Age: 74
End: 2019-11-04

## 2019-12-09 ENCOUNTER — APPOINTMENT (OUTPATIENT)
Dept: THORACIC SURGERY | Facility: CLINIC | Age: 74
End: 2019-12-09
Payer: MEDICARE

## 2019-12-09 ENCOUNTER — RX RENEWAL (OUTPATIENT)
Age: 74
End: 2019-12-09

## 2019-12-09 PROCEDURE — 99203 OFFICE O/P NEW LOW 30 MIN: CPT

## 2019-12-09 NOTE — PHYSICAL EXAM
[General Appearance - Alert] : alert [Sclera] : the sclera and conjunctiva were normal [PERRL With Normal Accommodation] : pupils were equal in size, round, and reactive to light [General Appearance - In No Acute Distress] : in no acute distress [Outer Ear] : the ears and nose were normal in appearance [Extraocular Movements] : extraocular movements were intact [Neck Appearance] : the appearance of the neck was normal [Oropharynx] : the oropharynx was normal [Neck Cervical Mass (___cm)] : no neck mass was observed [Jugular Venous Distention Increased] : there was no jugular-venous distention [Thyroid Diffuse Enlargement] : the thyroid was not enlarged [Thyroid Nodule] : there were no palpable thyroid nodules [Heart Rate And Rhythm] : heart rate was normal and rhythm regular [Auscultation Breath Sounds / Voice Sounds] : lungs were clear to auscultation bilaterally [Heart Sounds Gallop] : no gallops [Heart Sounds] : normal S1 and S2 [Murmurs] : no murmurs [Heart Sounds Pericardial Friction Rub] : no pericardial rub [Examination Of The Chest] : the chest was normal in appearance [Chest Visual Inspection Thoracic Asymmetry] : no chest asymmetry [Bowel Sounds] : normal bowel sounds [Diminished Respiratory Excursion] : normal chest expansion [Abdomen Tenderness] : non-tender [Abdomen Soft] : soft [Abdomen Mass (___ Cm)] : no abdominal mass palpated [Cervical Lymph Nodes Enlarged Posterior Bilaterally] : posterior cervical [Cervical Lymph Nodes Enlarged Anterior Bilaterally] : anterior cervical [Femoral Lymph Nodes Enlarged Bilaterally] : femoral [Supraclavicular Lymph Nodes Enlarged Bilaterally] : supraclavicular [Axillary Lymph Nodes Enlarged Bilaterally] : axillary [No Spinal Tenderness] : no spinal tenderness [Inguinal Lymph Nodes Enlarged Bilaterally] : inguinal [No CVA Tenderness] : no ~M costovertebral angle tenderness [Abnormal Walk] : normal gait [Musculoskeletal - Swelling] : no joint swelling seen [Nail Clubbing] : no clubbing  or cyanosis of the fingernails [Motor Tone] : muscle strength and tone were normal [Skin Color & Pigmentation] : normal skin color and pigmentation [Skin Turgor] : normal skin turgor [No Focal Deficits] : no focal deficits [Sensation] : the sensory exam was normal to light touch and pinprick [] : no rash [Deep Tendon Reflexes (DTR)] : deep tendon reflexes were 2+ and symmetric [Impaired Insight] : insight and judgment were intact [Affect] : the affect was normal [Oriented To Time, Place, And Person] : oriented to person, place, and time

## 2019-12-10 NOTE — REVIEW OF SYSTEMS
[Negative] : Psychiatric [Shortness Of Breath] : no shortness of breath [Wheezing] : no wheezing [SOB on Exertion] : no shortness of breath during exertion [Cough] : no cough [Orthopnea] : no orthopnea [Abdominal Pain] : no abdominal pain [Constipation] : no constipation [Vomiting] : no vomiting [Heartburn] : no heartburn [Diarrhea] : no diarrhea [Melena] : no melena

## 2019-12-10 NOTE — HISTORY OF PRESENT ILLNESS
[FreeTextEntry1] : This is a 75 y/o F who presents to our office with cc of "acidic taste in her mouth" and persistent belching.  She states she has had this issue for several years and is followed by a gastroenterologist.  She currently takes Dexelent and zantac (every other day) and states they help these symptoms.  \par \par Patient denies epigastric pain, regurgitation, vomiting, dysphagia an all respiratory symptoms.\par \par

## 2019-12-10 NOTE — ASSESSMENT
[FreeTextEntry1] : 73 y/o F who presents with symptoms of belching and "acidic taste" for a second opinion regarding her GERD management.  At this time there is no indication for surgical intervention.  She has expressed that her symptoms are minimal and improve with medication.  She has been instructed to continue management from Dr. Lynn and follow up with our office should her symptoms worsen or if she has any questions going forward.

## 2019-12-16 ENCOUNTER — MEDICATION RENEWAL (OUTPATIENT)
Age: 74
End: 2019-12-16

## 2019-12-25 ENCOUNTER — RESULT REVIEW (OUTPATIENT)
Age: 74
End: 2019-12-25

## 2019-12-26 ENCOUNTER — APPOINTMENT (OUTPATIENT)
Dept: GASTROENTEROLOGY | Facility: HOSPITAL | Age: 74
End: 2019-12-26

## 2019-12-26 DIAGNOSIS — R10.10 UPPER ABDOMINAL PAIN, UNSPECIFIED: ICD-10-CM

## 2019-12-27 DIAGNOSIS — R10.9 UNSPECIFIED ABDOMINAL PAIN: ICD-10-CM

## 2019-12-27 PROBLEM — R10.10 UPPER ABDOMINAL PAIN OF UNKNOWN ETIOLOGY: Status: ACTIVE | Noted: 2019-12-27

## 2020-01-03 ENCOUNTER — RESULT REVIEW (OUTPATIENT)
Age: 75
End: 2020-01-03

## 2020-01-03 PROBLEM — R10.9 RIGHT SIDED ABDOMINAL PAIN: Status: ACTIVE | Noted: 2020-01-03

## 2020-01-13 ENCOUNTER — RX RENEWAL (OUTPATIENT)
Age: 75
End: 2020-01-13

## 2020-01-21 ENCOUNTER — APPOINTMENT (OUTPATIENT)
Dept: GASTROENTEROLOGY | Facility: CLINIC | Age: 75
End: 2020-01-21
Payer: MEDICARE

## 2020-01-21 VITALS
SYSTOLIC BLOOD PRESSURE: 144 MMHG | WEIGHT: 188 LBS | BODY MASS INDEX: 34.6 KG/M2 | DIASTOLIC BLOOD PRESSURE: 66 MMHG | HEIGHT: 62 IN

## 2020-01-21 PROCEDURE — 99214 OFFICE O/P EST MOD 30 MIN: CPT

## 2020-01-21 RX ORDER — RANITIDINE HYDROCHLORIDE 300 MG/1
300 TABLET, FILM COATED ORAL
Refills: 0 | Status: DISCONTINUED | COMMUNITY
End: 2020-01-21

## 2020-01-21 NOTE — ASSESSMENT
[FreeTextEntry1] : 1.  epigastric pain..resolved\par 2.  esophageal reflux symptoms; somewhat atypical, and indeed, is this sensation in the throat truly reflux, especially in view of the atypical features mentioned above\par \par 3.  patient is already on Dexilant, which is an extremely effective heartburn medication\par \par 4.  I  have already discussed with patient the use of Gaviscon advance...it has more alginate, a protein that develops into a foam when it hits gastric acid..\par \par 5.  use one or two tabs..chew and swallow\par More than 50% of the face to face time was devoted to counseling and /or coordination of care.  THis coordination of care may have included reviewing other medical notes and reports, and communicating with other health professionals\par \par rv in three months

## 2020-01-21 NOTE — HISTORY OF PRESENT ILLNESS
[FreeTextEntry1] : 1.  Esophageal reflux; acidic taste in her mouth, epigastric pain\par \par Note; the epigastric pain, constant, began while Stefano was on a cruise, rather abruptly\par and this helped preciptate a gi work up\par which included;\par egd, negat\par egd biopsies; negative\par and ultrasound of abdomen; suspicion of tiny gallstones, but non shadowing..so they could be tiny gall bladder polyps\par this would not constitue a good reason for taking out the gall bladder\par \par fortunately, the epigastric pain subsided\par \par patient now is back to the baseline, but the baseline always includes having this abnormal sensation in the mouth, of there being acid in the mouth, and Laisha refers to this as her reflux\par \par HOWEVER, SHE DOES NOT HAVE TRUE HEARTBURN, SHE DOES NOT FIND ANY SENSITIVITY TO TYPICAL ESOPHAGEAL IRRITANTS, ALTHOUGH PERHAPS THE SYMPTOMS COULD OCCUR WITH TOMATO SAUCE OR WITH CHOCOLATE.\par \par drinks about one or two cups of coffecc, black.\par occas citrus..which is not a problem\par

## 2020-03-02 ENCOUNTER — RX RENEWAL (OUTPATIENT)
Age: 75
End: 2020-03-02

## 2020-03-11 ENCOUNTER — APPOINTMENT (OUTPATIENT)
Dept: ENDOCRINOLOGY | Facility: CLINIC | Age: 75
End: 2020-03-11
Payer: MEDICARE

## 2020-03-11 VITALS
DIASTOLIC BLOOD PRESSURE: 70 MMHG | HEIGHT: 62 IN | BODY MASS INDEX: 34.23 KG/M2 | HEART RATE: 59 BPM | WEIGHT: 186 LBS | SYSTOLIC BLOOD PRESSURE: 120 MMHG | OXYGEN SATURATION: 99 %

## 2020-03-11 DIAGNOSIS — Z86.69 PERSONAL HISTORY OF OTHER DISEASES OF THE NERVOUS SYSTEM AND SENSE ORGANS: ICD-10-CM

## 2020-03-11 LAB
25(OH)D3 SERPL-MCNC: 38.8 NG/ML
CHOLEST SERPL-MCNC: 171 MG/DL
CHOLEST/HDLC SERPL: 2.1 RATIO
ESTIMATED AVERAGE GLUCOSE: 108 MG/DL
HBA1C MFR BLD HPLC: 5.4 %
HDLC SERPL-MCNC: 83 MG/DL
LDLC SERPL CALC-MCNC: 64 MG/DL
MAGNESIUM SERPL-MCNC: 2 MG/DL
T4 FREE SERPL-MCNC: 1.4 NG/DL
TRIGL SERPL-MCNC: 124 MG/DL
TSH SERPL-ACNC: 3.48 UIU/ML

## 2020-03-11 PROCEDURE — 99214 OFFICE O/P EST MOD 30 MIN: CPT | Mod: 25

## 2020-03-11 PROCEDURE — G0447 BEHAVIOR COUNSEL OBESITY 15M: CPT | Mod: 59

## 2020-03-11 RX ORDER — DEXLANSOPRAZOLE 60 MG/1
60 CAPSULE, DELAYED RELEASE ORAL DAILY
Qty: 90 | Refills: 0 | Status: DISCONTINUED | COMMUNITY
Start: 2019-02-25 | End: 2020-03-11

## 2020-03-11 NOTE — HISTORY OF PRESENT ILLNESS
[FreeTextEntry1] :  74 yo WW from [facility] coming for f/u hypothyroidism, strong family history of thyroid problems\par        hyperlipidemia, stopped Pravastatin as she had memory problems with it, still has it with Rosuvatstatin 5mg qhs \par lost weight due to heartburn sees GI recent EGD good per pt , feels better now sees Dr Lynn last 1/20\par        seen Dr Norberto Reynolds for memory problems \par        also Dr Oneil for HTN\par        feels better since started gardening \par no  more fatigue and lost 3lb since last visit \par recent right hip replacement feels better can move more \par        9/16 had neck laminectomy, had infection and allergy to the suture material , has had multiple herniated disks\par        has been on thyroid supplement since her 20's , \par        Synthroid 112mcg TANNER qd labs reviewed normal TFTs\par        labs reviewed good  \par        had thyroid US 1980's was fine, repeated 2011 small thyroid, no nodules\par        denies FHx thyroid cancer\par        had forehead radiation 1948 for a skin lesion\par        has on and off dysphagia\par        had EGD Dr Lynn \par        has no dysphonia\par        denies dyspnea\par        thyroid US 2011 small thyroid no nodules\par        labs reviewed high normal thyroid, finally low normal Vit D was low prior\par        had no CBCsince 4/17, does not want to have it today,, has h/o prior anemia per pt\par \par thyroid US 2011 MPRESSION: No focal nodules. Small heterogeneous thyroid gland similar to prior sonogram consistent with chronic thyroiditis.\par dexa scan 2011 normal \par has chronic fatigue sees also Cardiology

## 2020-03-11 NOTE — REVIEW OF SYSTEMS
[Recent Weight Loss (___ Lbs)] : recent [unfilled] ~Ulb weight loss [Joint Pain] : joint pain [Muscle Cramps] : muscle cramps [Myalgia] : myalgia  [Back Pain] : back pain [Depression] : depression [Anxiety] : anxiety [Negative] : Heme/Lymph [Fatigue] : no fatigue [Recent Weight Gain (___ Lbs)] : no recent weight gain

## 2020-05-21 ENCOUNTER — APPOINTMENT (OUTPATIENT)
Dept: GASTROENTEROLOGY | Facility: CLINIC | Age: 75
End: 2020-05-21
Payer: MEDICARE

## 2020-05-21 PROCEDURE — 99443: CPT | Mod: 95

## 2020-05-21 NOTE — ASSESSMENT
[FreeTextEntry1] : esophageal reflux, now actually getting better over the last month, or last few weeks\par \par it certainly appears to be reflux\par \par but she has gained weight recently which is a good sign\par \par she does not want to take gavison...\par \par plan;\par 1.  try pantoprazole 40 mg two times per day\par \par 2.  since she doesn’t want to get gaviscon advance,  but i am urging her to try it\par \par 3.  have patient return to the office three months..but call earlier and make phone visit if need be\par \par More than 50% of the face to face time was devoted to counseling and /or coordination of care.  THis coordination of care may have included reviewing other medical notes and reports, and communicating with other health professionals\par

## 2020-05-21 NOTE — REASON FOR VISIT
[Follow-Up: _____] : a [unfilled] follow-up visit [FreeTextEntry1] : telephone visit, for regurgitation, almost everything is regurgitated

## 2020-05-21 NOTE — HISTORY OF PRESENT ILLNESS
[FreeTextEntry1] : 1 reflux and regurgitation;\par got worse as of december, 2019, when she had normal egd\par \par it occurs virtually on a daily basis...\par \par she gets it any time, whether she eats or not..\par \par the regurgitation comes right back up to her throat..\par \par has lost twenty five pounds..\par \par no nocturnal symptoms.\par \par not much pain in upper abdomen\par \par no relationship to esophageal irritants...\par \par takes pantoprazole.\par \par uses gaviscon but it is locally purchased\par \par only when she thinks of it.\par \par no dysphagia\par \par the upper abdominal pain began when she was on a cruise.\par \par the pain is right under the ribs, mid epigastric\par \par appetite is variable, but now her weight is stabilizing..\par \par even without dieting, lost weight...but her weight, initially down forty pounds, now back up ten pounds\par \par if anything the reflux is decreasing somewhat..\par \par re citrus; she avoids red sauces, citrus, oj, avoids alcohol , quit smoking twenty years ago,  \par \par with bending over, it doesn’t seem to get worse\par \par

## 2020-05-31 ENCOUNTER — LABORATORY RESULT (OUTPATIENT)
Age: 75
End: 2020-05-31

## 2020-06-02 ENCOUNTER — APPOINTMENT (OUTPATIENT)
Dept: CARDIOLOGY | Facility: CLINIC | Age: 75
End: 2020-06-02

## 2020-06-10 ENCOUNTER — APPOINTMENT (OUTPATIENT)
Dept: ENDOCRINOLOGY | Facility: CLINIC | Age: 75
End: 2020-06-10
Payer: MEDICARE

## 2020-06-10 ENCOUNTER — APPOINTMENT (OUTPATIENT)
Dept: ENDOCRINOLOGY | Facility: CLINIC | Age: 75
End: 2020-06-10

## 2020-06-10 DIAGNOSIS — K80.20 CALCULUS OF GALLBLADDER W/OUT CHOLECYSTITIS W/OUT OBSTRUCTION: ICD-10-CM

## 2020-06-10 PROCEDURE — 99442: CPT | Mod: 95

## 2020-06-10 NOTE — REVIEW OF SYSTEMS
[Joint Pain] : joint pain [Myalgia] : myalgia  [Muscle Cramps] : muscle cramps [Back Pain] : back pain [Depression] : depression [Anxiety] : anxiety [Negative] : Heme/Lymph

## 2020-06-10 NOTE — HISTORY OF PRESENT ILLNESS
[Home] : at home, [unfilled] , at the time of the visit. [Medical Office: ([facility])___] : at the medical office located in  [FreeTextEntry1] :  76 yo WW from [facility] coming for f/u hypothyroidism, strong family history of thyroid problems\par on her way to Dr Gail Thornton \par        hyperlipidemia, stopped Pravastatin as she had memory problems with it, still has it with Rosuvatstatin 5mg qhs \par lost weight due to heartburn sees GI recent EGD good per pt , feels better now sees Dr Lynn last 1/20\par        seen Dr Norberto Reynolds for memory problems \par        also Dr Oneil for HTN\par        feels better since started gardening \par no  more fatigue and lost 3lb since last visit \par recent right hip replacement feels better can move more \par        9/16 had neck laminectomy, had infection and allergy to the suture material , has had multiple herniated disks\par        has been on thyroid supplement since her 20's , \par        Synthroid 112mcg TANNER qd labs reviewed normal TFTs\par        labs reviewed good  \par        had thyroid US 1980's was fine, repeated 2011 small thyroid, no nodules\par        denies FHx thyroid cancer\par        had forehead radiation 1948 for a skin lesion\par        has on and off dysphagia\par        had EGD Dr Lynn \par        has no dysphonia\par        denies dyspnea\par        thyroid US 2011 small thyroid no nodules\par        labs reviewed high normal thyroid, finally low normal Vit D was low prior\par        had no CBCsince 4/17, does not want to have it today,, has h/o prior anemia per pt\par \par thyroid US 2011 MPRESSION: No focal nodules. Small heterogeneous thyroid gland similar to prior sonogram consistent with chronic thyroiditis.\par dexa scan 2011 normal \par has chronic fatigue sees also Cardiology

## 2020-07-13 ENCOUNTER — RX RENEWAL (OUTPATIENT)
Age: 75
End: 2020-07-13

## 2020-08-17 ENCOUNTER — RX RENEWAL (OUTPATIENT)
Age: 75
End: 2020-08-17

## 2020-08-27 ENCOUNTER — RX RENEWAL (OUTPATIENT)
Age: 75
End: 2020-08-27

## 2020-09-08 ENCOUNTER — LABORATORY RESULT (OUTPATIENT)
Age: 75
End: 2020-09-08

## 2020-09-15 ENCOUNTER — APPOINTMENT (OUTPATIENT)
Dept: FAMILY MEDICINE | Facility: CLINIC | Age: 75
End: 2020-09-15
Payer: MEDICARE

## 2020-09-15 VITALS — SYSTOLIC BLOOD PRESSURE: 122 MMHG | DIASTOLIC BLOOD PRESSURE: 70 MMHG

## 2020-09-15 PROCEDURE — 99213 OFFICE O/P EST LOW 20 MIN: CPT | Mod: 25

## 2020-09-15 PROCEDURE — 90662 IIV NO PRSV INCREASED AG IM: CPT

## 2020-09-15 PROCEDURE — G0008: CPT

## 2020-09-15 NOTE — HISTORY OF PRESENT ILLNESS
[FreeTextEntry1] : GERD\par Need Flu shot [de-identified] : Reports worsening of her GERD in spite of taking PPI\par Need flu shot and Shingrix

## 2020-09-17 ENCOUNTER — APPOINTMENT (OUTPATIENT)
Dept: GASTROENTEROLOGY | Facility: CLINIC | Age: 75
End: 2020-09-17
Payer: MEDICARE

## 2020-09-17 PROCEDURE — 99442: CPT | Mod: 95

## 2020-09-17 NOTE — ASSESSMENT
[FreeTextEntry1] : esophageal reflux\par \par regurgitation\par \par she should eliminate vodka as a trial\par \par inconsistance food intolerance\par \par she needs to distribute calories from dinner to breakfast and lunch\par \par patient really needs to try to have a smaller \par \par she has gaviscon advance...she needs to chew and swalow..can use two at a time..\par \par small meals, multiple, not one meal per day\par \par don’t eat within three hours of bedtime\par \par decrease or eliminate vodka for six weeks.time\par \par More than 50% of the face to face time was devoted to counseling and /or coordination of care.  THis coordination of care may have included reviewing other medical notes and reports, and communicating with other health professionals\par \par perhaps she will eventually need anti reflux procedure\par

## 2020-09-17 NOTE — HISTORY OF PRESENT ILLNESS
[FreeTextEntry1] : complains of severe reflux, beclching all day, throat burning..\par \par any greasy food makes it worse\par \par she vomits up bile occasionally\par does not wake up at night, sleeps through the night, sleeps with head of bed elevated with multiple pillows\par \par uses wedge pillow\par avoids red sauce\par no problem with peppers onions, and she can eat onions\par \par caffeine, she avoids \par no use of alcohol\par \par couple glasses of vodka, several times per week..\par \par size of meals may have a role...but she says she is not a big eater.\par often skips lunch, coffee for breakfast\par \par

## 2020-09-21 LAB
CHOLEST SERPL-MCNC: 161 MG/DL
CHOLEST/HDLC SERPL: 2.2 RATIO
HDLC SERPL-MCNC: 74 MG/DL
LDLC SERPL CALC-MCNC: 66 MG/DL
T4 FREE SERPL-MCNC: 1.2 NG/DL
TRIGL SERPL-MCNC: 106 MG/DL
TSH SERPL-ACNC: 7.17 UIU/ML

## 2020-09-30 ENCOUNTER — RX RENEWAL (OUTPATIENT)
Age: 75
End: 2020-09-30

## 2020-10-13 ENCOUNTER — APPOINTMENT (OUTPATIENT)
Dept: OBGYN | Facility: CLINIC | Age: 75
End: 2020-10-13
Payer: MEDICARE

## 2020-10-13 VITALS
HEIGHT: 62 IN | DIASTOLIC BLOOD PRESSURE: 70 MMHG | BODY MASS INDEX: 33.13 KG/M2 | WEIGHT: 180 LBS | SYSTOLIC BLOOD PRESSURE: 130 MMHG

## 2020-10-13 PROCEDURE — G0101: CPT

## 2020-10-22 ENCOUNTER — LABORATORY RESULT (OUTPATIENT)
Age: 75
End: 2020-10-22

## 2020-11-04 ENCOUNTER — RESULT REVIEW (OUTPATIENT)
Age: 75
End: 2020-11-04

## 2020-11-13 ENCOUNTER — APPOINTMENT (OUTPATIENT)
Dept: ENDOCRINOLOGY | Facility: CLINIC | Age: 75
End: 2020-11-13
Payer: MEDICARE

## 2020-11-13 VITALS
WEIGHT: 178 LBS | HEART RATE: 60 BPM | TEMPERATURE: 98.5 F | BODY MASS INDEX: 32.76 KG/M2 | SYSTOLIC BLOOD PRESSURE: 124 MMHG | HEIGHT: 62 IN | OXYGEN SATURATION: 98 % | DIASTOLIC BLOOD PRESSURE: 68 MMHG

## 2020-11-13 LAB
ALBUMIN SERPL ELPH-MCNC: 4.5 G/DL
ALP BLD-CCNC: 131 U/L
ALT SERPL-CCNC: 12 U/L
ANION GAP SERPL CALC-SCNC: 15 MMOL/L
AST SERPL-CCNC: 16 U/L
BILIRUB SERPL-MCNC: 0.5 MG/DL
BUN SERPL-MCNC: 16 MG/DL
CALCIUM SERPL-MCNC: 9.5 MG/DL
CHLORIDE SERPL-SCNC: 104 MMOL/L
CHOLEST SERPL-MCNC: 172 MG/DL
CO2 SERPL-SCNC: 24 MMOL/L
CREAT SERPL-MCNC: 0.88 MG/DL
ESTIMATED AVERAGE GLUCOSE: 105 MG/DL
GGT SERPL-CCNC: 10 U/L
GLUCOSE SERPL-MCNC: 101 MG/DL
HBA1C MFR BLD HPLC: 5.3 %
HDLC SERPL-MCNC: 74 MG/DL
LDLC SERPL CALC-MCNC: 82 MG/DL
NONHDLC SERPL-MCNC: 98 MG/DL
POTASSIUM SERPL-SCNC: 4.2 MMOL/L
PROT SERPL-MCNC: 6.7 G/DL
SODIUM SERPL-SCNC: 142 MMOL/L
TRIGL SERPL-MCNC: 78 MG/DL
TSH SERPL-ACNC: 4.61 UIU/ML

## 2020-11-13 PROCEDURE — 36415 COLL VENOUS BLD VENIPUNCTURE: CPT

## 2020-11-13 PROCEDURE — 99215 OFFICE O/P EST HI 40 MIN: CPT | Mod: 25

## 2020-11-13 NOTE — REVIEW OF SYSTEMS
[Recent Weight Loss (___ Lbs)] : recent weight loss: [unfilled] lbs [Negative] : Heme/Lymph [de-identified] : memory problems

## 2020-11-13 NOTE — HISTORY OF PRESENT ILLNESS
[Calcium (dietary)] : dietary Calcium [Vitamin D (oral)] : Vitamin D orally [Disordered Eating] : history of disordered eating [Taking Steroids] : no history of taking steroids [Diabetes] : no history of diabetes [Kidney Stones] : no history of kidney stones [Sedentary] : no sedentary lifestyle [Current Smoking___(ppd)] : not currently smoking [Previous Fragility Fracture] : no previous fragility fracture [Regular Dental Follow-Up] : no regular dental follow-up [Family History of Hip Fracture] : no family history of hip fracture [FreeTextEntry1] :  76 yo WW from [facility] coming for f/u hypothyroidism, strong family history of thyroid problems\par on her way to Dr Gail Thornton \par        hyperlipidemia, stopped Pravastatin as she had memory problems with it, still has it with Rosuvatstatin 5mg qhs \par lost weight due to heartburn sees GI recent EGD good per pt , feels better now sees Dr Lynn last 1/20\par        seen Dr Norberto Reynolds for memory problems \par        also Dr Oneil for HTN\par        feels better since started gardening \par no  more fatigue and lost 3lb since last visit \par recent right hip replacement feels better can move more \par        9/16 had neck laminectomy, had infection and allergy to the suture material , has had multiple herniated disks\par        has been on thyroid supplement since her 20's , \par        Synthroid 112mcg TANNER qd labs reviewed normal TFTs\par        labs reviewed good  \par        had thyroid US 1980's was fine, repeated 2011 small thyroid, no nodules\par        denies FHx thyroid cancer\par        had forehead radiation 1948 for a skin lesion\par        has on and off dysphagia\par        had EGD Dr Lynn \par        has no dysphonia\par        denies dyspnea\par        thyroid US 2011 small thyroid no nodules\par        labs reviewed high normal thyroid, finally low normal Vit D was low prior\par        had no CBCsince 4/17, does not want to have it today,, has h/o prior anemia per pt\par \par thyroid US 2011 MPRESSION: No focal nodules. Small heterogeneous thyroid gland similar to prior sonogram consistent with chronic thyroiditis.\par dexa scan 2011 normal \par has chronic fatigue sees also Cardiology

## 2020-11-13 NOTE — HISTORY OF PRESENT ILLNESS
[Calcium (dietary)] : dietary Calcium [Vitamin D (oral)] : Vitamin D orally [Disordered Eating] : history of disordered eating [Taking Steroids] : no history of taking steroids [Diabetes] : no history of diabetes [Kidney Stones] : no history of kidney stones [Sedentary] : no sedentary lifestyle [Current Smoking___(ppd)] : not currently smoking [Previous Fragility Fracture] : no previous fragility fracture [Regular Dental Follow-Up] : no regular dental follow-up [Family History of Hip Fracture] : no family history of hip fracture [FreeTextEntry1] :  76 yo WW from [facility] coming for f/u hypothyroidism, strong family history of thyroid problems\par on her way to Dr Gail Thornton \par        hyperlipidemia, stopped Pravastatin as she had memory problems with it, still has it with Rosuvatstatin 5mg qhs \par lost weight due to heartburn sees GI recent EGD good per pt , feels better now sees Dr Lynn last 1/20\par        seen Dr Norberto Reynolds for memory problems \par        also Dr Oneil for HTN\par        feels better since started gardening \par no  more fatigue and lost 3lb since last visit \par recent right hip replacement feels better can move more \par        9/16 had neck laminectomy, had infection and allergy to the suture material , has had multiple herniated disks\par        has been on thyroid supplement since her 20's , \par        Synthroid 112mcg TANNER qd labs reviewed normal TFTs\par        labs reviewed good  \par        had thyroid US 1980's was fine, repeated 2011 small thyroid, no nodules\par        denies FHx thyroid cancer\par        had forehead radiation 1948 for a skin lesion\par        has on and off dysphagia\par        had EGD Dr Lynn \par        has no dysphonia\par        denies dyspnea\par        thyroid US 2011 small thyroid no nodules\par        labs reviewed high normal thyroid, finally low normal Vit D was low prior\par        had no CBCsince 4/17, does not want to have it today,, has h/o prior anemia per pt\par \par thyroid US 2011 MPRESSION: No focal nodules. Small heterogeneous thyroid gland similar to prior sonogram consistent with chronic thyroiditis.\par dexa scan 2011 normal \par has chronic fatigue sees also Cardiology

## 2020-11-13 NOTE — REVIEW OF SYSTEMS
[Recent Weight Loss (___ Lbs)] : recent weight loss: [unfilled] lbs [Negative] : Heme/Lymph [de-identified] : memory problems

## 2020-11-16 LAB
25(OH)D3 SERPL-MCNC: 39.7 NG/ML
ALBUMIN SERPL ELPH-MCNC: 4.5 G/DL
ALP BLD-CCNC: 131 U/L
ALT SERPL-CCNC: 10 U/L
ANION GAP SERPL CALC-SCNC: 16 MMOL/L
AST SERPL-CCNC: 15 U/L
BILIRUB SERPL-MCNC: 0.3 MG/DL
BUN SERPL-MCNC: 14 MG/DL
CALCIUM SERPL-MCNC: 9.8 MG/DL
CALCIUM SERPL-MCNC: 9.8 MG/DL
CHLORIDE SERPL-SCNC: 103 MMOL/L
CO2 SERPL-SCNC: 23 MMOL/L
CREAT SERPL-MCNC: 0.92 MG/DL
GLUCOSE SERPL-MCNC: 107 MG/DL
MAGNESIUM SERPL-MCNC: 2 MG/DL
PARATHYROID HORMONE INTACT: 57 PG/ML
PHOSPHATE SERPL-MCNC: 3.4 MG/DL
POTASSIUM SERPL-SCNC: 4.6 MMOL/L
PROT SERPL-MCNC: 6.7 G/DL
SODIUM SERPL-SCNC: 142 MMOL/L

## 2020-11-17 LAB
ALBUMIN MFR SERPL ELPH: 60.2 %
ALBUMIN SERPL-MCNC: 4 G/DL
ALBUMIN/GLOB SERPL: 1.5 RATIO
ALPHA1 GLOB MFR SERPL ELPH: 4.7 %
ALPHA1 GLOB SERPL ELPH-MCNC: 0.3 G/DL
ALPHA2 GLOB MFR SERPL ELPH: 9.5 %
ALPHA2 GLOB SERPL ELPH-MCNC: 0.6 G/DL
B-GLOBULIN MFR SERPL ELPH: 11.6 %
B-GLOBULIN SERPL ELPH-MCNC: 0.8 G/DL
GAMMA GLOB FLD ELPH-MCNC: 0.9 G/DL
GAMMA GLOB MFR SERPL ELPH: 14 %
INTERPRETATION SERPL IEP-IMP: NORMAL
PROT SERPL-MCNC: 6.7 G/DL
PROT SERPL-MCNC: 6.7 G/DL

## 2020-11-23 ENCOUNTER — APPOINTMENT (OUTPATIENT)
Dept: GASTROENTEROLOGY | Facility: CLINIC | Age: 75
End: 2020-11-23
Payer: MEDICARE

## 2020-11-23 PROCEDURE — 99442: CPT | Mod: 95

## 2020-11-23 NOTE — HISTORY OF PRESENT ILLNESS
[FreeTextEntry1] : patient has turned positive re her reflux symptoms\par \par especially the last week\par \par she does not actually know what she has been using\par but it has been better\par \par Leonard her  is very supportive\par \par

## 2020-11-23 NOTE — ASSESSMENT
[FreeTextEntry1] : 1.  patient is doing better, but only recently\par \par 2.  the small meals, the pantoprazole bid for now, and liberal use of the gaviscon advance which she purchased on line\par \par 3.  whenever, there is increased symptamatology, i urge the patient to take extra dose of gaviscon advance, two tablets, as it has no significant adverse consequences\par \par 4.  repeat a telephonic eight weeks from now..\par \par More than 50% of the face to face time was devoted to counseling and /or coordination of care.  THis coordination of care may have included reviewing other medical notes and reports, and communicating with other health professionals\par

## 2020-11-29 ENCOUNTER — RESULT REVIEW (OUTPATIENT)
Age: 75
End: 2020-11-29

## 2020-12-08 ENCOUNTER — APPOINTMENT (OUTPATIENT)
Dept: FAMILY MEDICINE | Facility: CLINIC | Age: 75
End: 2020-12-08
Payer: MEDICARE

## 2020-12-08 VITALS
HEIGHT: 62 IN | HEART RATE: 78 BPM | WEIGHT: 177 LBS | DIASTOLIC BLOOD PRESSURE: 70 MMHG | BODY MASS INDEX: 32.57 KG/M2 | RESPIRATION RATE: 16 BRPM | OXYGEN SATURATION: 98 % | TEMPERATURE: 97.6 F | SYSTOLIC BLOOD PRESSURE: 136 MMHG

## 2020-12-08 PROCEDURE — 99214 OFFICE O/P EST MOD 30 MIN: CPT

## 2020-12-08 NOTE — ASSESSMENT
[FreeTextEntry1] : This patient is a 76 y/o F presenting with concerns of weight loss and memory loss. Our records indicate that she has not lost significant weight in the last year, however we will continue to observe her weight at future visits to make sure that there is no abnormal weight loss.\par \par Additionally, the patient believes that she is suffering from memory problems, however she has no signs of significant memory loss based off of her independent lifestyle and lack of problems associated with typical memory loss. She seems cognitively normal for her age in office today. She is understandably concerned given her family history of dementia and we will continue to monitor her mental function moving forward.

## 2020-12-08 NOTE — REVIEW OF SYSTEMS
[Recent Change In Weight] : ~T recent weight change [Memory Loss] : memory loss [Negative] : Heme/Lymph [Fever] : no fever [Chills] : no chills [Fatigue] : no fatigue [Hot Flashes] : no hot flashes [Night Sweats] : no night sweats [Headache] : no headache [Dizziness] : no dizziness [Fainting] : no fainting [Confusion] : no confusion [Unsteady Walking] : no ataxia

## 2020-12-08 NOTE — PLAN
[FreeTextEntry1] : - memory loss - continue to monitor at future visits bearing in mind patient's concerns and family history\par \par - weight loss - continue to monitor at follow up visits\par \par - make f/u appt for 3mos

## 2020-12-08 NOTE — HISTORY OF PRESENT ILLNESS
[FreeTextEntry1] : Patient presents with concerns of weight loss and memory loss\par F/u chronic medical problemss [de-identified] : 76 y/o F presenting with concerns of weight loss and memory loss. She reports that, according to her home scales, she has lost 40 lbs in the last 6 months; this is in contrast to our records that indicate she has not lost a significant amount of weight in this time. She does report that she has gone down a dress size however all of her clothes still fit. She reports no dietary changes, exercise changes, change in bowel habits, or abdominal pain. Patient also reports that her  believes she has memory loss. She is, however, still able to conduct all daily activities and lives an independent lifestyle; she drives herself without getting lost, she does her own finances, she does not leave appliances on by mistake around the house.

## 2020-12-08 NOTE — HEALTH RISK ASSESSMENT
[30 or more] : 30 or more [Yes] : Yes [2 - 3 times a week (3 pts)] : 2 - 3  times a week (3 points) [1 or 2 (0 pts)] : 1 or 2 (0 points) [Never (0 pts)] : Never (0 points) [No falls in past year] : Patient reported no falls in the past year [0] : 2) Feeling down, depressed, or hopeless: Not at all (0) [] : No [YearQuit] : 2016

## 2020-12-08 NOTE — PHYSICAL EXAM
[Normal] : normal gait, coordination grossly intact, no focal deficits and deep tendon reflexes were 2+ and symmetric [Speech Grossly Normal] : speech grossly normal [Memory Grossly Normal] : memory grossly normal [Normal Affect] : the affect was normal [Alert and Oriented x3] : oriented to person, place, and time [Normal Mood] : the mood was normal [Normal Insight/Judgement] : insight and judgment were intact

## 2020-12-16 ENCOUNTER — RX RENEWAL (OUTPATIENT)
Age: 75
End: 2020-12-16

## 2020-12-31 ENCOUNTER — APPOINTMENT (OUTPATIENT)
Dept: FAMILY MEDICINE | Facility: CLINIC | Age: 75
End: 2020-12-31
Payer: MEDICARE

## 2020-12-31 VITALS — TEMPERATURE: 98.1 F

## 2020-12-31 DIAGNOSIS — Z71.89 OTHER SPECIFIED COUNSELING: ICD-10-CM

## 2020-12-31 DIAGNOSIS — Z20.828 CONTACT WITH AND (SUSPECTED) EXPOSURE TO OTHER VIRAL COMMUNICABLE DISEASES: ICD-10-CM

## 2020-12-31 PROCEDURE — 99212 OFFICE O/P EST SF 10 MIN: CPT | Mod: CS,25

## 2021-01-04 LAB — SARS-COV-2 N GENE NPH QL NAA+PROBE: NOT DETECTED

## 2021-02-25 ENCOUNTER — APPOINTMENT (OUTPATIENT)
Dept: GASTROENTEROLOGY | Facility: CLINIC | Age: 76
End: 2021-02-25

## 2021-03-01 ENCOUNTER — APPOINTMENT (OUTPATIENT)
Dept: FAMILY MEDICINE | Facility: CLINIC | Age: 76
End: 2021-03-01
Payer: MEDICARE

## 2021-03-01 VITALS
SYSTOLIC BLOOD PRESSURE: 135 MMHG | BODY MASS INDEX: 32.2 KG/M2 | HEIGHT: 62 IN | TEMPERATURE: 98.1 F | RESPIRATION RATE: 16 BRPM | OXYGEN SATURATION: 98 % | HEART RATE: 77 BPM | DIASTOLIC BLOOD PRESSURE: 73 MMHG | WEIGHT: 175 LBS

## 2021-03-01 PROCEDURE — 99214 OFFICE O/P EST MOD 30 MIN: CPT

## 2021-03-01 NOTE — PLAN
[FreeTextEntry1] : Overall is doing well\par compliant with medication\par Renewed medication for cholesterol, low vitamin D and hypothyroidism\par F/U in 4 mo or PRN

## 2021-03-01 NOTE — HISTORY OF PRESENT ILLNESS
[FreeTextEntry1] : Patient presents with concerns of weight loss and memory loss\par F/u chronic medical problems\par Medication renewal [de-identified] : 77 y/o F presenting with concerns of weight loss and memory loss. She reports that, according to her home scales, she has lost 40 lbs in the last 6 months; this is in contrast to our records that indicate she has not lost a significant amount of weight in this time. She does report that she has gone down a dress size however all of her clothes still fit. She reports no dietary changes, exercise changes, change in bowel habits, or abdominal pain. Patient also reports that her  believes she has memory loss. She is, however, still able to conduct all daily activities and lives an independent lifestyle; she drives herself without getting lost, she does her own finances, she does not leave appliances on by mistake around the house.

## 2021-03-12 ENCOUNTER — RX RENEWAL (OUTPATIENT)
Age: 76
End: 2021-03-12

## 2021-03-13 ENCOUNTER — RX RENEWAL (OUTPATIENT)
Age: 76
End: 2021-03-13

## 2021-03-15 NOTE — REASON FOR VISIT
Statement Selected [FreeTextEntry1] : The patient was followed with principal diagnoses of hyperlipidemia, previous history of shortness of breath, nonobstructive CAD. She is scheduled for elective right hip replacement surgery with Dr. DURON  on April 23 at [facility].

## 2021-03-16 ENCOUNTER — RX RENEWAL (OUTPATIENT)
Age: 76
End: 2021-03-16

## 2021-03-16 LAB
25(OH)D3 SERPL-MCNC: 38.5 NG/ML
ALBUMIN SERPL ELPH-MCNC: 4.2 G/DL
ALP BLD-CCNC: 127 U/L
ALT SERPL-CCNC: 10 U/L
ANION GAP SERPL CALC-SCNC: 11 MMOL/L
AST SERPL-CCNC: 15 U/L
BILIRUB SERPL-MCNC: 0.5 MG/DL
BUN SERPL-MCNC: 12 MG/DL
CALCIUM SERPL-MCNC: 9.2 MG/DL
CALCIUM SERPL-MCNC: 9.2 MG/DL
CHLORIDE SERPL-SCNC: 107 MMOL/L
CO2 SERPL-SCNC: 25 MMOL/L
CREAT SERPL-MCNC: 0.84 MG/DL
GLUCOSE SERPL-MCNC: 103 MG/DL
PARATHYROID HORMONE INTACT: 87 PG/ML
POTASSIUM SERPL-SCNC: 4.1 MMOL/L
PROT SERPL-MCNC: 6.6 G/DL
SODIUM SERPL-SCNC: 143 MMOL/L
T4 FREE SERPL-MCNC: 1.4 NG/DL
TSH SERPL-ACNC: 2.13 UIU/ML

## 2021-03-19 ENCOUNTER — NON-APPOINTMENT (OUTPATIENT)
Age: 76
End: 2021-03-19

## 2021-03-19 ENCOUNTER — APPOINTMENT (OUTPATIENT)
Dept: ENDOCRINOLOGY | Facility: CLINIC | Age: 76
End: 2021-03-19
Payer: MEDICARE

## 2021-03-19 VITALS
OXYGEN SATURATION: 98 % | BODY MASS INDEX: 33.68 KG/M2 | HEIGHT: 62 IN | HEART RATE: 73 BPM | SYSTOLIC BLOOD PRESSURE: 120 MMHG | WEIGHT: 183 LBS | TEMPERATURE: 96.8 F | DIASTOLIC BLOOD PRESSURE: 70 MMHG

## 2021-03-19 PROCEDURE — G0447 BEHAVIOR COUNSEL OBESITY 15M: CPT | Mod: 59

## 2021-03-19 PROCEDURE — 99215 OFFICE O/P EST HI 40 MIN: CPT | Mod: 25

## 2021-03-19 RX ORDER — LEVOTHYROXINE SODIUM 112 UG/1
112 TABLET ORAL
Qty: 84 | Refills: 1 | Status: DISCONTINUED | COMMUNITY
Start: 2021-03-12 | End: 2021-03-19

## 2021-03-19 NOTE — REVIEW OF SYSTEMS
[Recent Weight Gain (___ Lbs)] : recent weight gain: [unfilled] lbs [Depression] : depression [Anxiety] : anxiety [Negative] : Heme/Lymph [de-identified] : memory problems

## 2021-03-19 NOTE — HISTORY OF PRESENT ILLNESS
[FreeTextEntry1] :  77 yo WW from [facility] coming for f/u hypothyroidism, osteoporosis strong family history of thyroid problems\par started to have serious memory problems on therapy with Dr Thornton \par also treated by  Dr Gail Thornton \par        hyperlipidemia, stopped Pravastatin as she had memory problems with it, still has it with Rosuvatstatin 5mg qhs memory problems are the same \par        also Dr Oneil for HTN\par        feels better since started gardening \par \par recent right hip replacement feels better can move more \par        9/16 had neck laminectomy, had infection and allergy to the suture material , has had multiple herniated disks\par        has been on thyroid supplement since her 20's , \par        Synthroid 137mcg TANNER qd labs reviewed normal TFTs\par        labs reviewed good  \par        had thyroid US 1980's was fine, repeated 2011 small thyroid, no nodules\par        denies FHx thyroid cancer\par        had forehead radiation 1948 for a skin lesion\par        has on and off dysphagia\par        had EGD Dr Lynn \par        has no dysphonia\par        denies dyspnea\par        thyroid US 2011 small thyroid no nodules\par        labs reviewed high normal thyroid, finally low normal Vit D was low prior\par \par \par thyroid US 2011 MPRESSION: No focal nodules. Small heterogeneous thyroid gland similar to prior sonogram consistent with chronic thyroiditis.\par dexa scan 2011 normal \par has chronic fatigue sees also Cardiology

## 2021-06-09 ENCOUNTER — RX RENEWAL (OUTPATIENT)
Age: 76
End: 2021-06-09

## 2021-07-01 ENCOUNTER — APPOINTMENT (OUTPATIENT)
Dept: FAMILY MEDICINE | Facility: CLINIC | Age: 76
End: 2021-07-01
Payer: MEDICARE

## 2021-07-01 VITALS
HEART RATE: 47 BPM | RESPIRATION RATE: 16 BRPM | DIASTOLIC BLOOD PRESSURE: 60 MMHG | SYSTOLIC BLOOD PRESSURE: 120 MMHG | HEIGHT: 62 IN | OXYGEN SATURATION: 98 % | TEMPERATURE: 98.6 F | BODY MASS INDEX: 33.68 KG/M2 | WEIGHT: 183 LBS

## 2021-07-01 DIAGNOSIS — R79.9 ABNORMAL FINDING OF BLOOD CHEMISTRY, UNSPECIFIED: ICD-10-CM

## 2021-07-01 PROCEDURE — 36415 COLL VENOUS BLD VENIPUNCTURE: CPT

## 2021-07-01 PROCEDURE — 99214 OFFICE O/P EST MOD 30 MIN: CPT | Mod: 25

## 2021-07-01 NOTE — HISTORY OF PRESENT ILLNESS
[FreeTextEntry1] : Patient presents with concerns of weight loss and memory loss\par F/u chronic medical problems\par Medication renewal [de-identified] : 75 y/o F presenting with concerns of weight loss and memory loss. She reports that, according to her home scales, she has lost 40 lbs in the last 6 months; this is in contrast to our records that indicate she has not lost a significant amount of weight in this time. She does report that she has gone down a dress size however all of her clothes still fit. She reports no dietary changes, exercise changes, change in bowel habits, or abdominal pain. Patient also reports that her  believes she has memory loss. She is, however, still able to conduct all daily activities and lives an independent lifestyle; she drives herself without getting lost, she does her own finances, she does not leave appliances on by mistake around the house.

## 2021-07-01 NOTE — PLAN
[FreeTextEntry1] : Passed cognitive screening for date, presidents calculus\par taking care of finances at home\par completed the covid vaccine\par return in 4-6 mo or as needed

## 2021-07-02 ENCOUNTER — APPOINTMENT (OUTPATIENT)
Dept: INTERNAL MEDICINE | Facility: CLINIC | Age: 76
End: 2021-07-02
Payer: MEDICARE

## 2021-07-02 DIAGNOSIS — R63.4 ABNORMAL WEIGHT LOSS: ICD-10-CM

## 2021-07-02 PROCEDURE — 36415 COLL VENOUS BLD VENIPUNCTURE: CPT

## 2021-07-06 ENCOUNTER — RX RENEWAL (OUTPATIENT)
Age: 76
End: 2021-07-06

## 2021-07-06 LAB
ALBUMIN SERPL ELPH-MCNC: 4.2 G/DL
ALP BLD-CCNC: 109 U/L
ALT SERPL-CCNC: 15 U/L
ANION GAP SERPL CALC-SCNC: 12 MMOL/L
AST SERPL-CCNC: 19 U/L
BASOPHILS # BLD AUTO: 0.05 K/UL
BASOPHILS NFR BLD AUTO: 1.1 %
BILIRUB SERPL-MCNC: 0.5 MG/DL
BUN SERPL-MCNC: 20 MG/DL
CALCIUM SERPL-MCNC: 9.3 MG/DL
CHLORIDE SERPL-SCNC: 106 MMOL/L
CHOLEST SERPL-MCNC: 163 MG/DL
CO2 SERPL-SCNC: 24 MMOL/L
CREAT SERPL-MCNC: 0.99 MG/DL
EOSINOPHIL # BLD AUTO: 0.06 K/UL
EOSINOPHIL NFR BLD AUTO: 1.3 %
ESTIMATED AVERAGE GLUCOSE: 103 MG/DL
GLUCOSE SERPL-MCNC: 94 MG/DL
HBA1C MFR BLD HPLC: 5.2 %
HCT VFR BLD CALC: 39.9 %
HDLC SERPL-MCNC: 67 MG/DL
HGB BLD-MCNC: 12.2 G/DL
IMM GRANULOCYTES NFR BLD AUTO: 0.2 %
LDLC SERPL CALC-MCNC: 81 MG/DL
LYMPHOCYTES # BLD AUTO: 1.4 K/UL
LYMPHOCYTES NFR BLD AUTO: 30.6 %
MAN DIFF?: NORMAL
MCHC RBC-ENTMCNC: 27.7 PG
MCHC RBC-ENTMCNC: 30.6 GM/DL
MCV RBC AUTO: 90.7 FL
MONOCYTES # BLD AUTO: 0.4 K/UL
MONOCYTES NFR BLD AUTO: 8.7 %
NEUTROPHILS # BLD AUTO: 2.66 K/UL
NEUTROPHILS NFR BLD AUTO: 58.1 %
NONHDLC SERPL-MCNC: 96 MG/DL
PLATELET # BLD AUTO: 28 K/UL
POTASSIUM SERPL-SCNC: 4.5 MMOL/L
PROT SERPL-MCNC: 6.2 G/DL
RBC # BLD: 4.4 M/UL
RBC # FLD: 13.8 %
SODIUM SERPL-SCNC: 142 MMOL/L
TRIGL SERPL-MCNC: 73 MG/DL
TSH SERPL-ACNC: 1.04 UIU/ML
VIT B12 SERPL-MCNC: 384 PG/ML
WBC # FLD AUTO: 4.58 K/UL

## 2021-07-09 ENCOUNTER — APPOINTMENT (OUTPATIENT)
Dept: ENDOCRINOLOGY | Facility: CLINIC | Age: 76
End: 2021-07-09
Payer: MEDICARE

## 2021-07-09 VITALS
SYSTOLIC BLOOD PRESSURE: 112 MMHG | BODY MASS INDEX: 33.86 KG/M2 | WEIGHT: 184 LBS | OXYGEN SATURATION: 98 % | RESPIRATION RATE: 16 BRPM | HEIGHT: 62 IN | DIASTOLIC BLOOD PRESSURE: 62 MMHG | HEART RATE: 57 BPM

## 2021-07-09 LAB
24R-OH-CALCIDIOL SERPL-MCNC: 77.6 PG/ML
25(OH)D3 SERPL-MCNC: 52 NG/ML
ALBUMIN SERPL ELPH-MCNC: 4.1 G/DL
ALP BLD-CCNC: 111 U/L
ALT SERPL-CCNC: 15 U/L
ANION GAP SERPL CALC-SCNC: 10 MMOL/L
AST SERPL-CCNC: 19 U/L
BILIRUB SERPL-MCNC: 0.4 MG/DL
BUN SERPL-MCNC: 19 MG/DL
CALCIUM SERPL-MCNC: 9.7 MG/DL
CALCIUM SERPL-MCNC: 9.7 MG/DL
CHLORIDE SERPL-SCNC: 108 MMOL/L
CHOLEST SERPL-MCNC: 164 MG/DL
CO2 SERPL-SCNC: 26 MMOL/L
CREAT SERPL-MCNC: 1.01 MG/DL
ESTIMATED AVERAGE GLUCOSE: 103 MG/DL
GLUCOSE SERPL-MCNC: 97 MG/DL
HBA1C MFR BLD HPLC: 5.2 %
HDLC SERPL-MCNC: 67 MG/DL
LDLC SERPL CALC-MCNC: 81 MG/DL
NONHDLC SERPL-MCNC: 97 MG/DL
PARATHYROID HORMONE INTACT: 45 PG/ML
POTASSIUM SERPL-SCNC: 4.5 MMOL/L
PROT SERPL-MCNC: 6.3 G/DL
SODIUM SERPL-SCNC: 144 MMOL/L
T4 FREE SERPL-MCNC: 1.2 NG/DL
TRIGL SERPL-MCNC: 82 MG/DL
TSH SERPL-ACNC: 1.35 UIU/ML

## 2021-07-09 PROCEDURE — 99215 OFFICE O/P EST HI 40 MIN: CPT | Mod: 25

## 2021-07-09 PROCEDURE — G0447 BEHAVIOR COUNSEL OBESITY 15M: CPT | Mod: 59

## 2021-07-09 RX ORDER — LEVOTHYROXINE SODIUM 0.12 MG/1
125 TABLET ORAL
Qty: 90 | Refills: 0 | Status: DISCONTINUED | COMMUNITY
Start: 2021-06-09 | End: 2021-07-09

## 2021-07-09 NOTE — REVIEW OF SYSTEMS
[Recent Weight Gain (___ Lbs)] : recent weight gain: [unfilled] lbs [Depression] : depression [Anxiety] : anxiety [Negative] : Heme/Lymph [de-identified] : memory problems

## 2021-07-09 NOTE — HISTORY OF PRESENT ILLNESS
[FreeTextEntry1] :  75 yo WW from [facility] coming for f/u hypothyroidism, osteoporosis strong family history of thyroid problems\par started to have serious memory problems on therapy with Dr Thornton \par also treated by  Dr Gail Thornton \par        hyperlipidemia, stopped Pravastatin as she had memory problems with it, still has it with Rosuvatstatin 5mg qhs memory problems are the same \par        also Dr Oneil for HTN\par        feels better since started gardening \par \par recent right hip replacement feels better can move more \par        9/16 had neck laminectomy, had infection and allergy to the suture material , has had multiple herniated disks\par        has been on thyroid supplement since her 20's , \par        Synthroid 137mcg TANNER qd labs reviewed normal TFTs however in the computer we also have Levothyroxine 125 ? pt not sure what she takes \par        labs reviewed good  \par        had thyroid US 1980's was fine, repeated 2011 small thyroid, no nodules\par        denies FHx thyroid cancer\par        had forehead radiation 1948 for a skin lesion\par        has on and off dysphagia\par        had EGD Dr Lynn \par        has no dysphonia\par        denies dyspnea\par        thyroid US 2011 small thyroid no nodules\par        labs reviewed high normal thyroid, finally low normal Vit D was low prior\par \par \par thyroid US 2011 MPRESSION: No focal nodules. Small heterogeneous thyroid gland similar to prior sonogram consistent with chronic thyroiditis.\par dexa scan 2011 normal \par has chronic fatigue sees also Cardiology

## 2021-08-09 ENCOUNTER — APPOINTMENT (OUTPATIENT)
Dept: CARDIOLOGY | Facility: CLINIC | Age: 76
End: 2021-08-09
Payer: MEDICARE

## 2021-08-09 VITALS
HEART RATE: 78 BPM | HEIGHT: 63 IN | TEMPERATURE: 97.8 F | DIASTOLIC BLOOD PRESSURE: 60 MMHG | SYSTOLIC BLOOD PRESSURE: 105 MMHG | WEIGHT: 184 LBS | BODY MASS INDEX: 32.6 KG/M2

## 2021-08-09 DIAGNOSIS — Z87.891 PERSONAL HISTORY OF NICOTINE DEPENDENCE: ICD-10-CM

## 2021-08-09 PROCEDURE — 93000 ELECTROCARDIOGRAM COMPLETE: CPT

## 2021-08-09 PROCEDURE — 99213 OFFICE O/P EST LOW 20 MIN: CPT

## 2021-08-09 NOTE — REASON FOR VISIT
[FreeTextEntry1] : The patient is followed with the principal diagnosis of hypertension and hyperlipidemia. She has done well in the two-year period that I have not seen her. She has avoided the code with epidemic and has received a double vaccine.\par There have been no symptoms of chest pain shortness of breath dizziness or palpitations.\par She does walking and exercise.

## 2021-08-09 NOTE — PHYSICAL EXAM

## 2021-08-09 NOTE — DISCUSSION/SUMMARY
[FreeTextEntry1] : The patient continues to do very well clinically. There have been no cardiac symptoms and her examination is normal. The blood pressure is 105/60 Anna the electrocardiogram is normal. Based on my examination and assessment I find the patient's condition to be stable. We will continue to follow her progress her current medications will be continued.

## 2021-08-10 ENCOUNTER — NON-APPOINTMENT (OUTPATIENT)
Age: 76
End: 2021-08-10

## 2021-09-04 ENCOUNTER — RX RENEWAL (OUTPATIENT)
Age: 76
End: 2021-09-04

## 2021-09-20 ENCOUNTER — APPOINTMENT (OUTPATIENT)
Dept: FAMILY MEDICINE | Facility: CLINIC | Age: 76
End: 2021-09-20
Payer: MEDICARE

## 2021-09-20 VITALS
HEIGHT: 63 IN | SYSTOLIC BLOOD PRESSURE: 110 MMHG | OXYGEN SATURATION: 98 % | BODY MASS INDEX: 32.25 KG/M2 | WEIGHT: 182 LBS | HEART RATE: 77 BPM | RESPIRATION RATE: 16 BRPM | TEMPERATURE: 98.1 F | DIASTOLIC BLOOD PRESSURE: 70 MMHG

## 2021-09-20 DIAGNOSIS — L08.9 LOCAL INFECTION OF THE SKIN AND SUBCUTANEOUS TISSUE, UNSPECIFIED: ICD-10-CM

## 2021-09-20 PROCEDURE — 99214 OFFICE O/P EST MOD 30 MIN: CPT

## 2021-09-20 NOTE — PLAN
[FreeTextEntry1] : Difficult to follow c recommendation\par patient's  in attendance instructed what to do\par call if not well

## 2021-09-20 NOTE — HISTORY OF PRESENT ILLNESS
[FreeTextEntry8] : skin itchiness\par scratching lesions, some infected\par Demented has problems following commend

## 2021-09-20 NOTE — PHYSICAL EXAM
[Normal] : soft, non-tender, non-distended, no masses palpated, no HSM and normal bowel sounds [de-identified] : scratching lesions

## 2021-10-12 ENCOUNTER — RX RENEWAL (OUTPATIENT)
Age: 76
End: 2021-10-12

## 2021-10-12 PROBLEM — Z12.31 OTHER SCREENING MAMMOGRAM: Status: ACTIVE | Noted: 2020-10-13

## 2021-10-14 ENCOUNTER — APPOINTMENT (OUTPATIENT)
Dept: OBGYN | Facility: CLINIC | Age: 76
End: 2021-10-14

## 2021-10-14 DIAGNOSIS — Z12.31 ENCOUNTER FOR SCREENING MAMMOGRAM FOR MALIGNANT NEOPLASM OF BREAST: ICD-10-CM

## 2021-11-01 ENCOUNTER — APPOINTMENT (OUTPATIENT)
Dept: FAMILY MEDICINE | Facility: CLINIC | Age: 76
End: 2021-11-01
Payer: MEDICARE

## 2021-11-01 ENCOUNTER — LABORATORY RESULT (OUTPATIENT)
Age: 76
End: 2021-11-01

## 2021-11-01 VITALS
HEART RATE: 63 BPM | HEIGHT: 63 IN | SYSTOLIC BLOOD PRESSURE: 110 MMHG | OXYGEN SATURATION: 100 % | BODY MASS INDEX: 32.07 KG/M2 | RESPIRATION RATE: 16 BRPM | DIASTOLIC BLOOD PRESSURE: 62 MMHG | TEMPERATURE: 98.2 F | WEIGHT: 181 LBS

## 2021-11-01 DIAGNOSIS — J45.909 UNSPECIFIED ASTHMA, UNCOMPLICATED: ICD-10-CM

## 2021-11-01 DIAGNOSIS — L25.9 UNSPECIFIED CONTACT DERMATITIS, UNSPECIFIED CAUSE: ICD-10-CM

## 2021-11-01 PROCEDURE — G0442 ANNUAL ALCOHOL SCREEN 15 MIN: CPT | Mod: 59

## 2021-11-01 PROCEDURE — 36415 COLL VENOUS BLD VENIPUNCTURE: CPT

## 2021-11-01 PROCEDURE — 96372 THER/PROPH/DIAG INJ SC/IM: CPT

## 2021-11-01 PROCEDURE — G0444 DEPRESSION SCREEN ANNUAL: CPT | Mod: 59

## 2021-11-01 PROCEDURE — 99214 OFFICE O/P EST MOD 30 MIN: CPT | Mod: 25

## 2021-11-01 PROCEDURE — G0439: CPT

## 2021-11-01 RX ORDER — PANTOPRAZOLE 40 MG/1
40 TABLET, DELAYED RELEASE ORAL DAILY
Qty: 30 | Refills: 5 | Status: DISCONTINUED | COMMUNITY
Start: 2019-12-18 | End: 2021-11-01

## 2021-11-01 RX ORDER — LEVOTHYROXINE SODIUM 137 UG/1
137 TABLET ORAL DAILY
Qty: 90 | Refills: 3 | Status: DISCONTINUED | COMMUNITY
Start: 2018-12-31 | End: 2021-11-01

## 2021-11-01 RX ORDER — RIVASTIGMINE 9.5 MG/24H
9.5 PATCH, EXTENDED RELEASE TRANSDERMAL DAILY
Refills: 0 | Status: DISCONTINUED | COMMUNITY
Start: 2021-03-19 | End: 2021-11-01

## 2021-11-01 NOTE — PLAN
[FreeTextEntry1] : Received 80 mg Methylprednisolone right deltoid\par Recommended to avoid  shaving and scratching\par pending dermato appointment

## 2021-11-01 NOTE — REVIEW OF SYSTEMS
[Itching] : Itching [Skin Rash] : skin rash [Negative] : Constitutional [Memory Loss] : no memory loss

## 2021-11-01 NOTE — HISTORY OF PRESENT ILLNESS
[FreeTextEntry8] : Complaining of  rash and  itching of the legs \par The patient has some cognitive issues\par Accompanied by  who reports that

## 2021-11-02 LAB
25(OH)D3 SERPL-MCNC: 67.6 NG/ML
ALBUMIN SERPL ELPH-MCNC: 4.4 G/DL
ALP BLD-CCNC: 120 U/L
ALT SERPL-CCNC: 10 U/L
ANION GAP SERPL CALC-SCNC: 12 MMOL/L
AST SERPL-CCNC: 12 U/L
BASOPHILS # BLD AUTO: 0.05 K/UL
BASOPHILS NFR BLD AUTO: 1 %
BILIRUB SERPL-MCNC: 0.5 MG/DL
BUN SERPL-MCNC: 17 MG/DL
CALCIUM SERPL-MCNC: 9.6 MG/DL
CHLORIDE SERPL-SCNC: 104 MMOL/L
CHOLEST SERPL-MCNC: 147 MG/DL
CO2 SERPL-SCNC: 25 MMOL/L
CREAT SERPL-MCNC: 0.86 MG/DL
EOSINOPHIL # BLD AUTO: 0.32 K/UL
EOSINOPHIL NFR BLD AUTO: 6.1 %
ESTIMATED AVERAGE GLUCOSE: 100 MG/DL
GLUCOSE SERPL-MCNC: 102 MG/DL
HBA1C MFR BLD HPLC: 5.1 %
HCT VFR BLD CALC: 41.7 %
HDLC SERPL-MCNC: 61 MG/DL
HGB BLD-MCNC: 12.8 G/DL
IMM GRANULOCYTES NFR BLD AUTO: 0.2 %
LDLC SERPL CALC-MCNC: 65 MG/DL
LYMPHOCYTES # BLD AUTO: 1 K/UL
LYMPHOCYTES NFR BLD AUTO: 19.1 %
MAN DIFF?: NORMAL
MCHC RBC-ENTMCNC: 27.7 PG
MCHC RBC-ENTMCNC: 30.7 GM/DL
MCV RBC AUTO: 90.3 FL
MONOCYTES # BLD AUTO: 0.48 K/UL
MONOCYTES NFR BLD AUTO: 9.2 %
NEUTROPHILS # BLD AUTO: 3.38 K/UL
NEUTROPHILS NFR BLD AUTO: 64.4 %
NONHDLC SERPL-MCNC: 86 MG/DL
PLATELET # BLD AUTO: 48 K/UL
POTASSIUM SERPL-SCNC: 4.1 MMOL/L
PROT SERPL-MCNC: 6.8 G/DL
RBC # BLD: 4.62 M/UL
RBC # FLD: 14.3 %
SODIUM SERPL-SCNC: 141 MMOL/L
TRIGL SERPL-MCNC: 104 MG/DL
TSH SERPL-ACNC: 0.02 UIU/ML
VIT B12 SERPL-MCNC: 345 PG/ML
WBC # FLD AUTO: 5.24 K/UL

## 2021-11-05 ENCOUNTER — RESULT REVIEW (OUTPATIENT)
Age: 76
End: 2021-11-05

## 2021-11-08 ENCOUNTER — RX RENEWAL (OUTPATIENT)
Age: 76
End: 2021-11-08

## 2021-11-08 DIAGNOSIS — R92.0 MAMMOGRAPHIC MICROCALCIFICATION FOUND ON DIAGNOSTIC IMAGING OF BREAST: ICD-10-CM

## 2021-11-09 ENCOUNTER — NON-APPOINTMENT (OUTPATIENT)
Age: 76
End: 2021-11-09

## 2021-11-09 ENCOUNTER — APPOINTMENT (OUTPATIENT)
Dept: FAMILY MEDICINE | Facility: CLINIC | Age: 76
End: 2021-11-09
Payer: MEDICARE

## 2021-11-09 VITALS — DIASTOLIC BLOOD PRESSURE: 67 MMHG | SYSTOLIC BLOOD PRESSURE: 111 MMHG

## 2021-11-09 DIAGNOSIS — T14.8XXA OTHER INJURY OF UNSPECIFIED BODY REGION, INITIAL ENCOUNTER: ICD-10-CM

## 2021-11-09 PROCEDURE — 99213 OFFICE O/P EST LOW 20 MIN: CPT

## 2021-11-09 NOTE — PLAN
[FreeTextEntry1] : Continue treatment for skin rash and prurigo with Zyrtec as recommended in ER\par Trial of seroquel\par F/U in 4 weeks

## 2021-11-09 NOTE — PHYSICAL EXAM
[Normal] : no acute distress, well nourished, well developed and well-appearing [de-identified] : rash, scratching lesions

## 2021-11-09 NOTE — HISTORY OF PRESENT ILLNESS
[FreeTextEntry8] : Dementia with agitation\par Does not know why is here and why she made an appointment\par Seen in the ER for the rash and scratching\par ER records reviewed as well encdocrinology notes

## 2021-11-11 ENCOUNTER — RESULT REVIEW (OUTPATIENT)
Age: 76
End: 2021-11-11

## 2021-11-11 ENCOUNTER — NON-APPOINTMENT (OUTPATIENT)
Age: 76
End: 2021-11-11

## 2021-11-11 ENCOUNTER — APPOINTMENT (OUTPATIENT)
Dept: HEMATOLOGY ONCOLOGY | Facility: CLINIC | Age: 76
End: 2021-11-11
Payer: MEDICARE

## 2021-11-11 VITALS
OXYGEN SATURATION: 99 % | WEIGHT: 182.19 LBS | HEART RATE: 76 BPM | SYSTOLIC BLOOD PRESSURE: 139 MMHG | DIASTOLIC BLOOD PRESSURE: 74 MMHG | TEMPERATURE: 97.8 F | HEIGHT: 61.42 IN | BODY MASS INDEX: 33.96 KG/M2 | RESPIRATION RATE: 16 BRPM

## 2021-11-11 DIAGNOSIS — N63.20 UNSPECIFIED LUMP IN THE LEFT BREAST, UNSPECIFIED QUADRANT: ICD-10-CM

## 2021-11-11 PROCEDURE — 99205 OFFICE O/P NEW HI 60 MIN: CPT | Mod: 25

## 2021-11-11 PROCEDURE — 36415 COLL VENOUS BLD VENIPUNCTURE: CPT

## 2021-11-12 ENCOUNTER — RX RENEWAL (OUTPATIENT)
Age: 76
End: 2021-11-12

## 2021-11-19 ENCOUNTER — APPOINTMENT (OUTPATIENT)
Dept: ENDOCRINOLOGY | Facility: CLINIC | Age: 76
End: 2021-11-19
Payer: MEDICARE

## 2021-11-19 VITALS
HEART RATE: 61 BPM | SYSTOLIC BLOOD PRESSURE: 110 MMHG | HEIGHT: 61.42 IN | OXYGEN SATURATION: 98 % | DIASTOLIC BLOOD PRESSURE: 60 MMHG | BODY MASS INDEX: 33.55 KG/M2 | WEIGHT: 180 LBS

## 2021-11-19 LAB
24R-OH-CALCIDIOL SERPL-MCNC: 74.5 PG/ML
24R-OH-CALCIDIOL SERPL-MCNC: 74.9 PG/ML
25(OH)D3 SERPL-MCNC: 58.2 NG/ML
25(OH)D3 SERPL-MCNC: 62.1 NG/ML
ALBUMIN SERPL ELPH-MCNC: 4.3 G/DL
ALBUMIN SERPL ELPH-MCNC: 4.4 G/DL
ALP BLD-CCNC: 127 U/L
ALP BLD-CCNC: 128 U/L
ALT SERPL-CCNC: 10 U/L
ALT SERPL-CCNC: 6 U/L
ANION GAP SERPL CALC-SCNC: 12 MMOL/L
ANION GAP SERPL CALC-SCNC: 15 MMOL/L
AST SERPL-CCNC: 12 U/L
AST SERPL-CCNC: 12 U/L
BILIRUB SERPL-MCNC: 0.6 MG/DL
BILIRUB SERPL-MCNC: 0.6 MG/DL
BUN SERPL-MCNC: 15 MG/DL
BUN SERPL-MCNC: 16 MG/DL
CALCIUM SERPL-MCNC: 9.4 MG/DL
CALCIUM SERPL-MCNC: 9.4 MG/DL
CALCIUM SERPL-MCNC: 9.5 MG/DL
CALCIUM SERPL-MCNC: 9.5 MG/DL
CHLORIDE SERPL-SCNC: 104 MMOL/L
CHLORIDE SERPL-SCNC: 105 MMOL/L
CHOLEST SERPL-MCNC: 157 MG/DL
CO2 SERPL-SCNC: 24 MMOL/L
CO2 SERPL-SCNC: 25 MMOL/L
COLLAGEN NTX UR-SCNC: 351 NMOL BCE
COLLAGEN NTX/CREAT UR-SRTO: 35
CREAT SERPL-MCNC: 0.96 MG/DL
CREAT SERPL-MCNC: 0.97 MG/DL
CREAT UR-MCNC: 114.4 MG/DL
ESTIMATED AVERAGE GLUCOSE: 100 MG/DL
GLUCOSE SERPL-MCNC: 94 MG/DL
GLUCOSE SERPL-MCNC: 94 MG/DL
HBA1C MFR BLD HPLC: 5.1 %
HDLC SERPL-MCNC: 69 MG/DL
INTERPRETIVE GUIDE:: NORMAL
LDLC SERPL CALC-MCNC: 71 MG/DL
MAGNESIUM SERPL-MCNC: 1.9 MG/DL
NONHDLC SERPL-MCNC: 87 MG/DL
PARATHYROID HORMONE INTACT: 61 PG/ML
PARATHYROID HORMONE INTACT: 66 PG/ML
PHOSPHATE SERPL-MCNC: 3.3 MG/DL
POTASSIUM SERPL-SCNC: 4.6 MMOL/L
POTASSIUM SERPL-SCNC: 4.6 MMOL/L
PROT SERPL-MCNC: 6.6 G/DL
PROT SERPL-MCNC: 6.7 G/DL
SODIUM SERPL-SCNC: 142 MMOL/L
SODIUM SERPL-SCNC: 142 MMOL/L
T4 FREE SERPL-MCNC: 2.3 NG/DL
T4 FREE SERPL-MCNC: 2.4 NG/DL
TRIGL SERPL-MCNC: 83 MG/DL
TSH SERPL-ACNC: 0.03 UIU/ML
TSH SERPL-ACNC: 0.04 UIU/ML

## 2021-11-19 PROCEDURE — G2212 PROLONG OUTPT/OFFICE VIS: CPT

## 2021-11-19 PROCEDURE — 99215 OFFICE O/P EST HI 40 MIN: CPT

## 2021-11-19 RX ORDER — LEVOTHYROXINE SODIUM 0.12 MG/1
125 TABLET ORAL
Refills: 0 | Status: DISCONTINUED | COMMUNITY
End: 2021-11-19

## 2021-11-19 NOTE — REVIEW OF SYSTEMS
[Recent Weight Gain (___ Lbs)] : recent weight gain: [unfilled] lbs [Depression] : depression [Anxiety] : anxiety [Negative] : Heme/Lymph [de-identified] : memory problems

## 2021-11-19 NOTE — REASON FOR VISIT
[Follow - Up] : a follow-up visit [Hypothyroidism] : hypothyroidism [Osteoporosis] : osteoporosis [Hyperparathyroidism] : hyperparathyroidism [Weight Management/Obesity] : weight management/obesity

## 2021-11-19 NOTE — HISTORY OF PRESENT ILLNESS
[FreeTextEntry1] :  77 yo WW from [facility] coming for f/u hypothyroidism, osteoporosis strong family history of thyroid problems\par started to have serious memory problems on therapy with Dr Thornton \par today first time she came with her  due to her memory problems \par also treated by  Dr Gail Thornton for demnetia \par        hyperlipidemia, stopped Pravastatin as she had memory problems with it, still has it with Rosuvatstatin 5mg qhs memory problems are the same \par        also Dr Oneil for HTN\par        feels better since started gardening \par \par recent right hip replacement feels better can move more \par        9/16 had neck laminectomy, had infection and allergy to the suture material , has had multiple herniated disks\par        has been on thyroid supplement since her 20's , \par        Levothyroxine  137mcg labs reviewed nhightTFTs \par        labs reviewed good  \par        had thyroid US 1980's was fine, repeated 2011 small thyroid, no nodules\par        denies FHx thyroid cancer\par        had forehead radiation 1948 for a skin lesion\par        has on and off dysphagia\par        had EGD Dr Lynn \par        has no dysphonia\par        denies dyspnea\par        thyroid US 2011 small thyroid no nodules\par        labs reviewed high normal thyroid, finally low normal Vit D was low prior\par \par \par thyroid US 2011 MPRESSION: No focal nodules. Small heterogeneous thyroid gland similar to prior sonogram consistent with chronic thyroiditis.\par dexa scan 2011 normal \par has chronic fatigue sees also Cardiology

## 2021-11-22 ENCOUNTER — APPOINTMENT (OUTPATIENT)
Dept: GASTROENTEROLOGY | Facility: CLINIC | Age: 76
End: 2021-11-22
Payer: MEDICARE

## 2021-11-22 ENCOUNTER — RESULT REVIEW (OUTPATIENT)
Age: 76
End: 2021-11-22

## 2021-11-22 VITALS
DIASTOLIC BLOOD PRESSURE: 60 MMHG | TEMPERATURE: 97.2 F | WEIGHT: 293 LBS | HEART RATE: 60 BPM | BODY MASS INDEX: 54.61 KG/M2 | SYSTOLIC BLOOD PRESSURE: 142 MMHG | HEIGHT: 61.42 IN

## 2021-11-22 PROCEDURE — 99214 OFFICE O/P EST MOD 30 MIN: CPT

## 2021-11-23 ENCOUNTER — NON-APPOINTMENT (OUTPATIENT)
Age: 76
End: 2021-11-23

## 2021-11-23 LAB — HEMOCCULT STL QL IA: NEGATIVE

## 2021-11-25 NOTE — PHYSICAL EXAM
[Abdomen Soft] : soft [Normal Sphincter Tone] : normal sphincter tone [No Rectal Mass] : no rectal mass [Occult Blood Positive] : stool was negative for occult blood [FreeTextEntry1] : soft brown stool;  sent for FIT test.

## 2021-11-25 NOTE — ASSESSMENT
[FreeTextEntry1] : 1.  patient has strong fh of colon ca, and personal hx of colon polyps\par 2.  perhaps it is time for Laisha to have test for Trujillo, with ms Nicole Boxer, at [facility]\par 3.  patient s sister Ivette has a very well worked out family tree\par \par we will hold off on colonoscopy now until there is more clarification on the patients thrombocytopenia

## 2021-11-25 NOTE — HISTORY OF PRESENT ILLNESS
[FreeTextEntry1] : this [age] y o patient, with suspected family hx of Trujillo, with personal hx of colon polyps, but nl colonoscopies in 2017 and 2018\par \par the is for deciding on timing of her next colonoscopy.\par \par there are two evolving issues\par a.  she is being evaluated for thrombocytopenia, which appears to have evolved rather quickly, and most recent plately count are in the 30 to 40 range.\par \par patient is being evaluated by Dr worthington \par \par \par 2.  re upper gi sx, patient is doing well on pantoprazole 40 mg per day, and denies having much in the way of upper gi sx\par \par 3.  re family hx of Trujillo syndrome, patient has two children, one aged 60, her son Pipe Parry\par \par and her daughter is fifty eight\par \par son had recent colonoscopy\par \par patient and her sister Ivette will be checking on the status of daughter, Cynthia, re her colonoscopy status.

## 2021-11-26 NOTE — REASON FOR VISIT
[facility]    History and Physical - Hospitalist Service       Date of Admission:  9/25/2019    Assessment & Plan   Jim Richard is a pleasant 63 year old male with history of alcoholic liver disease, ulcers, esophageal varices, CAD, hypertension, hyperlipidemia, peripheral vascular disease, tobacco use, subdural hematoma, and chronic low back pain who presented to the ED with generalized weakness. He also repeats melena x 2 days and feels he is withdrawing from ETOH as he has hand tremors and has not had a drink for almost 6 days. He is admitted for GI consultation (likely EGD) and CIWA monitoring.     Melena  Upper GI bleed, likely  Anemia, acute on chronic  History of ulcer and esophageal varices  Hospitalized 8/8/2019 for melena, upper and lower scope completed  - Hgb 8.9 on admission, slightly lower than prior levels; BP WNL and stable  - T/C done  - recheck hgb at 0600, transfuse if < 7 or dropping rapidly (ie less than 7.5 upon recheck)  - Protonix 80 iv once in ED, continue 40 iv bid  - Octreotide started in ED given history of varices, continued for now  - GI consultation - has been seen by [facility]   - NPO    Alcohol withdrawal  Alcoholic liver disease  Ascites - history of paracentesis  - last drink 6 days ago, tremors noted, no history of seizure  - no abdominal tenderness, feels likes it has increased over time  - CIWA protocol, telemetry, thiamine folate MV ordered   - s/p IVF w/ thiamine, mag, maintenance IV continued  - ceftriaxone iv ordered for SBP prophylaxis  - GI consulted as above; also SW/CD  - MELD score 14 (6% 3 month mortality)    Hypokalemia and hypomagnesemia  - likely secondary to ETOH intake and poor nutritional intake  - replete and monitor per protocol     Tobacco abuse  - pack a day smoker  - 21 mg patch ordered per request  - encouraged cessation    Depression and Anxiety  - stable upon admission  - PTA regimen can continue when verified, likely after  EGD    Pancytopenia  - likely secondary to chronic alcoholism (bone marrow suppression) and gi bleed  - wbc 3, hgb 8.9, plt 67  - management as above - recheck later this morning      Diet: NPO for Medical/Clinical Reasons Except for: Meds, Ice Chips    DVT Prophylaxis: Pneumatic Compression Devices  Leger Catheter: not present  Code Status: DNR/DNI  - discussed and confirmed with patient upon admission    Disposition Plan   Expected discharge: likely 2-3+ days pending GI eval/work up and likely endoscopy, SW/CD consultation  Entered: Jude Ledesma MD 09/25/2019, 3:07 AM     The patient's care was discussed with the Bedside Nurse, Patient and ER MD.    Jude Ledesma MD  [facility]    ______________________________________________________________________    Chief Complaint   Generalized weakness and melena    History is obtained from the patient    History of Present Illness   Jim Richard is a pleasant 63 year old male with history of alcoholic liver disease, ulcers, esophageal varices, CAD, hypertension, hyperlipidemia, peripheral vascular disease, tobacco use, subdural hematoma, and chronic low back pain who presented to the ED with generalized weakness.  He noticed this in the middle of the day while at home denies any falls or loss of consciousness.  He also denies any dizziness, shortness of breath, palpitations, chest pain, abdominal pain, nausea, or vomiting.  He does endorse black stool for the past couple days.  Last drink was about 6 days ago.  He says this is the worst withdrawal symptoms that he has had-most notably with hand tremors.  He denies any history of seizures.  We reviewed his admission earlier last month which resulted in upper and lower endoscopy.  Unfortunately he continues to smoke.    In the ED he was seen by Dr. Obregon with whom I discussed the case.  He is afebrile and hemodynamically stable and saturating normally on room air.  Blood pressures are 110 -  130 systolic.  Labs show a hemoglobin of 8.9 which is slightly lower than his baseline of around 9.5 - 10 or so in the last few months.  WBC 3.0 and platelets low at 67.  Potassium 3.1 magnesium 0.9.  Albumin 2.5, bilirubin 2.4 ALT 34 and .  INR 1.35.  Alkaline phosphatase 268.  On exam abdomen is full but there is no tenderness on palpation nor any rebound.  He has been typed and crossed.  He will be admitted for GI consultation and ongoing CIWA monitoring.    Review of Systems    The 10 point Review of Systems is negative other than noted in the HPI or here.     Past Medical History    I have reviewed this patient's medical history and updated it with pertinent information if needed.   Past Medical History:   Diagnosis Date     Alcoholism (H) 1/14/2013    had treatment at [facility]     Anxiety      Coronary artery disease 09/09/2014    Nuclear stress test with slight fixed defect inferiorly, no ischemia     Depression     w/anxiety     Esophageal varices with bleeding 04/28/2018    6 varices banded on EGD     Heart attack (H)      Hepatomegaly     Fatty liver, chronic alcoholic     Hyperlipemia      Hypertension      JANIS on CPAP      Peripheral vascular disease (H)      PVD (peripheral vascular disease) (H)      SDH (subdural hematoma) (H) 04/26/2018    Right side, resolved spontaneously     Spinal stenosis        Past Surgical History   I have reviewed this patient's surgical history and updated it with pertinent information if needed.  Past Surgical History:   Procedure Laterality Date     APPENDECTOMY       BYPASS GRAFT FEMOROPOPLITEAL  6/12/2012    Procedure: BYPASS GRAFT FEMOROPOPLITEAL;  LEFT FEMORAL TO ABOVE KNEE POPLITEAL BYPASS, ENDARTERECTOMY OF SFA AND PF ARTERIES, LEFT EXTERNAL ILIAC TO COMMON FEMORAL INTERPOSITION GRAFT;  Surgeon: Damir Roberts MD;  Location:  OR     COLONOSCOPY       COLONOSCOPY N/A 8/8/2019    Procedure: COLONOSCOPY;  Surgeon: Pavan Arguello MD;  Location:  GI      ENDARTERECTOMY FEMORAL  6/12/2012    Procedure: ENDARTERECTOMY FEMORAL;;  Surgeon: Damir Roberts MD;  Location:  OR     ESOPHAGOSCOPY, GASTROSCOPY, DUODENOSCOPY (EGD), COMBINED N/A 3/22/2018    Procedure: COMBINED ESOPHAGOSCOPY, GASTROSCOPY, DUODENOSCOPY (EGD);  ESOPHAGOSCOPY, GASTROSCOPY, DUODENOSOCPY.;  Surgeon: Valeri Pruitt MD;  Location:  OR     ESOPHAGOSCOPY, GASTROSCOPY, DUODENOSCOPY (EGD), COMBINED N/A 9/29/2018    Procedure: COMBINED ESOPHAGOSCOPY, GASTROSCOPY, DUODENOSCOPY (EGD);;  Surgeon: Rosendo Shaw MD;  Location:  GI     ESOPHAGOSCOPY, GASTROSCOPY, DUODENOSCOPY (EGD), COMBINED N/A 8/2/2019    Procedure: ESOPHAGOGASTRODUODENOSCOPY (EGD);  Surgeon: Pavan Arguello MD;  Location:  GI     HERNIA REPAIR  2017    Abdominal     LAMINECTOMY, FUSION LUMBAR THREE+ LEVEL, COMBINED N/A 9/22/2014    Procedure: COMBINED LAMINECTOMY, FUSION LUMBAR THREE+ LEVEL;  Surgeon: Sebastien Pruitt MD;  Location:  OR     TONSILLECTOMY & ADENOIDECTOMY         Social History   I have reviewed this patient's social history and updated it with pertinent information if needed.  Social History     Tobacco Use     Smoking status: Current Every Day Smoker     Packs/day: 1.00     Types: Cigarettes     Smokeless tobacco: Never Used     Tobacco comment: Chantix caused nightmares   Substance Use Topics     Alcohol use: Yes     Comment: last drink thursday 9/19/19     Drug use: No       Family History   I have reviewed this patient's family history and updated it with pertinent information if needed.   Family History   Problem Relation Age of Onset     Mental Illness Son      Coronary Artery Disease Early Onset Mother      Substance Abuse Father      Lung Cancer Father      Substance Abuse Brother      Unknown/Adopted No family hx of      Depression No family hx of      Anxiety Disorder No family hx of      Schizophrenia No family hx of      Bipolar Disorder No family hx of      Suicide No family hx of       Dementia No family hx of      [place] Disease No family hx of      Parkinsonism No family hx of      Autism Spectrum Disorder No family hx of      Intellectual Disability (Mental Retardation) No family hx of        Prior to Admission Medications   Prior to Admission Medications   Prescriptions Last Dose Informant Patient Reported? Taking?   DULoxetine (CYMBALTA) 60 MG EC capsule  Self No No   Sig: Take 1 capsule (60 mg) by mouth daily   QUEtiapine (SEROQUEL) 50 MG tablet   Yes No   Sig: Take 50 mg by mouth 3 times daily as needed (anxiety/sleep)   albuterol (PROAIR HFA/PROVENTIL HFA/VENTOLIN HFA) 108 (90 BASE) MCG/ACT Inhaler  Self Yes No   Sig: Inhale 2 puffs into the lungs every 6 hours as needed    fluticasone-vilanterol (BREO ELLIPTA) 200-25 MCG/INH oral inhaler  Self Yes No   Sig: Inhale 1 puff into the lungs daily    folic acid (FOLVITE) 1 MG tablet   No No   Sig: Take 1 tablet (1 mg) by mouth daily   furosemide (LASIX) 40 MG tablet   No No   Sig: Take 1 tablet (40 mg) by mouth daily   lactulose (CEPHULAC) 20 GM packet   No No   Sig: Take 1 packet (20 g) by mouth daily   mirtazapine (REMERON) 30 MG tablet   Yes No   Sig: Take 30 mg by mouth At Bedtime   multivitamin, therapeutic with minerals (THERA-VIT-M) TABS tablet  Self No No   Sig: Take 1 tablet by mouth daily   nicotine (NICODERM CQ) 21 MG/24HR 24 hr patch   No No   Sig: Place 1 patch onto the skin daily   order for DME   No No   Sig: Equipment being ordered: Walker Wheels () and Walker ()  Treatment Diagnosis: difficulty walking   pantoprazole (PROTONIX) 40 MG EC tablet   No No   Sig: Take 1 tablet (40 mg) by mouth every morning (before breakfast)   propranolol (INDERAL) 10 MG tablet   No No   Sig: Take 1 tablet (10 mg) by mouth 2 times daily   spironolactone (ALDACTONE) 25 MG tablet   No No   Sig: Take 2 tablets (50 mg) by mouth daily   tamsulosin (FLOMAX) 0.4 MG capsule   Yes No   Sig: Take 0.4 mg by mouth daily   vitamin B1  (THIAMINE) 100 MG tablet   No No   Sig: Take 1 tablet (100 mg) by mouth daily      Facility-Administered Medications: None     Allergies   Allergies   Allergen Reactions     Amlodipine Swelling     Lisinopril      Other reaction(s): Angioedema  Mouth and tongue swelling   Mouth and tongue swelling          Physical Exam   Vital Signs: Temp: 98.4  F (36.9  C) Temp src: Oral BP: 111/67 Pulse: 97 Heart Rate: 75 Resp: 22 SpO2: 98 %      Weight: 190 lbs 0 oz    Gen: NAD, pleasant  HEENT: Normocephalic, EOMI, MMM  Resp: no crackles,  no wheezes, no increased work of resp  CV: S1S2 heard, reg rhythm, reg rate, 1+ bilateral pitting pedal edema  Abdo: soft, nontender, full, no rebound, bowel sounds present  Ext: calves nontender, well perfused  Neuro: AAOx3, CN grossly intact, no facial asymmetry, bilateral hand tremor noted, some nystagmus also      Data   Data reviewed today: I reviewed all medications, new labs and imaging results over the last 24 hours. I personally reviewed no images or EKG's today.    Recent Labs   Lab 09/25/19  0025   WBC 3.0*   HGB 8.9*   MCV 97   PLT 67*   INR 1.35*      POTASSIUM 3.0*   CHLORIDE 101   CO2 28   BUN 17   CR 0.89   ANIONGAP 7   KEV 7.5*   GLC 92   ALBUMIN 2.5*   PROTTOTAL 6.1*   BILITOTAL 2.4*   ALKPHOS 268*   ALT 34   *     No results found for this or any previous visit (from the past 24 hour(s)).   [Initial Consultation] : an initial consultation for

## 2021-11-30 ENCOUNTER — RESULT REVIEW (OUTPATIENT)
Age: 76
End: 2021-11-30

## 2021-11-30 ENCOUNTER — APPOINTMENT (OUTPATIENT)
Dept: HEMATOLOGY ONCOLOGY | Facility: CLINIC | Age: 76
End: 2021-11-30
Payer: MEDICARE

## 2021-11-30 VITALS
RESPIRATION RATE: 16 BRPM | HEIGHT: 61.42 IN | WEIGHT: 182.38 LBS | HEART RATE: 61 BPM | SYSTOLIC BLOOD PRESSURE: 120 MMHG | DIASTOLIC BLOOD PRESSURE: 72 MMHG | TEMPERATURE: 97.2 F | OXYGEN SATURATION: 97 % | BODY MASS INDEX: 33.99 KG/M2

## 2021-11-30 PROBLEM — N63.20 LEFT BREAST MASS: Status: ACTIVE | Noted: 2019-10-10

## 2021-11-30 PROCEDURE — 36415 COLL VENOUS BLD VENIPUNCTURE: CPT

## 2021-11-30 PROCEDURE — 99214 OFFICE O/P EST MOD 30 MIN: CPT

## 2021-11-30 NOTE — PHYSICAL EXAM
[Fully active, able to carry on all pre-disease performance without restriction] : Status 0 - Fully active, able to carry on all pre-disease performance without restriction [Normal] : affect appropriate [de-identified] : bruising to right orbit s/p fall at home 11/22/2021- healing  [de-identified] : forgetfull

## 2021-11-30 NOTE — HISTORY OF PRESENT ILLNESS
[de-identified] : 76 year old female presents today for initial consultation of thrombocytopenia, referred by Dr. Akins.  Labs dated 11/2/2021 show a platelet count of 48, other hematological parameters WNL.  She was just placed on Seroquel for agitation secondary to dementia.  Hx difficult to obtain  \par \par She denies any bleeding  [de-identified] : Patient presents today for follow up of thrombocytopenia- she is s/p fall

## 2021-12-01 ENCOUNTER — RESULT REVIEW (OUTPATIENT)
Age: 76
End: 2021-12-01

## 2021-12-03 ENCOUNTER — RESULT REVIEW (OUTPATIENT)
Age: 76
End: 2021-12-03

## 2021-12-03 NOTE — HISTORY OF PRESENT ILLNESS
[de-identified] : 76 year old female presents today for initial consultation of thrombocytopenia, referred by Dr. Akins.  Labs dated 11/2/2021 show a platelet count of 48, other hematological parameters WNL.  She was just placed on Seroquel for agitation secondary to dementia.  Hx difficult to obtain  \par \par She denies any bleeding

## 2021-12-03 NOTE — PHYSICAL EXAM
[Fully active, able to carry on all pre-disease performance without restriction] : Status 0 - Fully active, able to carry on all pre-disease performance without restriction [Normal] : affect appropriate [de-identified] : forgetfull

## 2021-12-03 NOTE — PHYSICAL EXAM
[Fully active, able to carry on all pre-disease performance without restriction] : Status 0 - Fully active, able to carry on all pre-disease performance without restriction [Normal] : affect appropriate [de-identified] : forgetfull

## 2021-12-03 NOTE — ASSESSMENT
[FreeTextEntry1] : 75 yo female referred by Dr. Jennifer Akins for evaluation of thrombocytopenia.\par \par Patient has memory loss and doesn’t recall the details of her condition.\par She denies bleeding, fevers, night sweats, weight loss, recent infections or change in meds.\par I discussed with her  and she is drinking alcohol daily. \par Patient has positive LUCAS in the past. \par Labs were send for inflammatory parameters, nutritional deficiency, immunofixation, immunoglobulins. \par \par Follow up shortly.

## 2021-12-03 NOTE — ASSESSMENT
[FreeTextEntry1] : 77 yo female referred by Dr. Jennifer Akins for evaluation of thrombocytopenia.\par \par Patient has memory loss and doesn’t recall the details of her condition.\par She denies bleeding, fevers, night sweats, weight loss, recent infections or change in meds.\par I discussed with her  and she is drinking alcohol daily. \par Patient has positive LUCAS in the past. \par Labs were send for inflammatory parameters, nutritional deficiency, immunofixation, immunoglobulins. \par \par Follow up shortly.

## 2021-12-03 NOTE — HISTORY OF PRESENT ILLNESS
[de-identified] : 76 year old female presents today for initial consultation of thrombocytopenia, referred by Dr. Akins.  Labs dated 11/2/2021 show a platelet count of 48, other hematological parameters WNL.  She was just placed on Seroquel for agitation secondary to dementia.  Hx difficult to obtain  \par \par She denies any bleeding

## 2021-12-06 ENCOUNTER — RESULT REVIEW (OUTPATIENT)
Age: 76
End: 2021-12-06

## 2021-12-10 ENCOUNTER — NON-APPOINTMENT (OUTPATIENT)
Age: 76
End: 2021-12-10

## 2021-12-14 ENCOUNTER — RESULT REVIEW (OUTPATIENT)
Age: 76
End: 2021-12-14

## 2021-12-14 ENCOUNTER — APPOINTMENT (OUTPATIENT)
Dept: HEMATOLOGY ONCOLOGY | Facility: CLINIC | Age: 76
End: 2021-12-14
Payer: MEDICARE

## 2021-12-14 VITALS
BODY MASS INDEX: 34.07 KG/M2 | RESPIRATION RATE: 16 BRPM | OXYGEN SATURATION: 96 % | HEART RATE: 77 BPM | SYSTOLIC BLOOD PRESSURE: 126 MMHG | WEIGHT: 182.8 LBS | DIASTOLIC BLOOD PRESSURE: 71 MMHG | HEIGHT: 61.42 IN | TEMPERATURE: 97.6 F

## 2021-12-14 DIAGNOSIS — R21 RASH AND OTHER NONSPECIFIC SKIN ERUPTION: ICD-10-CM

## 2021-12-14 PROCEDURE — 36415 COLL VENOUS BLD VENIPUNCTURE: CPT

## 2021-12-14 PROCEDURE — 99214 OFFICE O/P EST MOD 30 MIN: CPT | Mod: 25

## 2021-12-14 RX ORDER — BETAMETHASONE DIPROPIONATE 0.5 MG/G
0.05 OINTMENT, AUGMENTED TOPICAL
Qty: 50 | Refills: 0 | Status: COMPLETED | COMMUNITY
Start: 2021-11-02 | End: 2021-12-14

## 2021-12-14 RX ORDER — ZOSTER VACCINE RECOMBINANT, ADJUVANTED 50 MCG/0.5
50 KIT INTRAMUSCULAR
Qty: 2 | Refills: 0 | Status: COMPLETED | COMMUNITY
Start: 2020-09-15 | End: 2021-12-14

## 2021-12-14 RX ORDER — BETAMETHASONE DIPROPIONATE 0.5 MG/G
0.05 CREAM, AUGMENTED TOPICAL
Qty: 30 | Refills: 0 | Status: COMPLETED | COMMUNITY
Start: 2021-10-04 | End: 2021-12-14

## 2021-12-14 RX ORDER — DOXYCYCLINE HYCLATE 100 MG/1
100 TABLET ORAL
Qty: 30 | Refills: 0 | Status: COMPLETED | COMMUNITY
Start: 2021-09-20 | End: 2021-12-14

## 2021-12-14 RX ORDER — ALBUTEROL SULFATE 90 UG/1
108 (90 BASE) INHALANT RESPIRATORY (INHALATION)
Qty: 1 | Refills: 5 | Status: COMPLETED | COMMUNITY
Start: 2019-03-04 | End: 2021-12-14

## 2021-12-14 RX ORDER — METHYLPREDNISOLONE 4 MG/1
4 TABLET ORAL
Qty: 21 | Refills: 0 | Status: COMPLETED | COMMUNITY
Start: 2021-11-03 | End: 2021-12-14

## 2021-12-14 NOTE — PHYSICAL EXAM
[Fully active, able to carry on all pre-disease performance without restriction] : Status 0 - Fully active, able to carry on all pre-disease performance without restriction [Normal] : pharynx is unremarkable, moist mucus membrane, no oral lesions [de-identified] : bruising to right orbit s/p fall at home 11/22/2021- healing  [de-identified] : LE rash [de-identified] : forgetfull

## 2021-12-14 NOTE — HISTORY OF PRESENT ILLNESS
[de-identified] : 76 year old female presents today for initial consultation of thrombocytopenia, referred by Dr. Akins.  Labs dated 11/2/2021 show a platelet count of 48, other hematological parameters WNL.  She was just placed on Seroquel for agitation secondary to dementia.  Hx difficult to obtain  \par \par She denies any bleeding  [de-identified] : Patient presents today for follow up of thrombocytopenia. She states feeling 'fine' but her RA and b/l LE has a diffuse, red, flat, intact itching rash. She was taking prednisone for the rash and itching but has since finished her dosage. She takes Zyrtec daily with minimal relief.

## 2021-12-14 NOTE — ASSESSMENT
[FreeTextEntry1] : 77 yo female referred by Dr. Jennifer Akins for evaluation of thrombocytopenia.\par \par Patient has memory loss and doesn’t recall the details of her condition.\par She denies bleeding, fevers, night sweats, weight loss, recent infections or change in meds.\par I discussed with her  and she is drinking alcohol daily. \par Patient has positive LUCAS in the past. \par Labs were send for inflammatory parameters, nutritional deficiency, immunofixation, immunoglobulins. \par \par Follow up shortly. \par \par 12/14/2021\par Patient here with  and sister Ivette ( proxy),  reviewed alcohol intake - both her and her  are drinking. Patient forgetful. \par s/p IV iron, \par Sister states patient and her  are in denial as far alcohol intake. \par Patient herself with memory loss.\par Platelets - 113k, improved. \par LUCAS - weak positive\par Derm evaluation done, scabs and rash LE - biopsy done, obtain results. \par vit B12 borderline - start vit B12 \par Suspected Trujillo syndrome in the family - Invitae ordered today\par Breast biopsy- Flat epithelial atypia, to see Dr. Reed.\par \par

## 2021-12-21 ENCOUNTER — NON-APPOINTMENT (OUTPATIENT)
Age: 76
End: 2021-12-21

## 2021-12-23 ENCOUNTER — APPOINTMENT (OUTPATIENT)
Dept: FAMILY MEDICINE | Facility: CLINIC | Age: 76
End: 2021-12-23
Payer: MEDICARE

## 2021-12-23 VITALS — DIASTOLIC BLOOD PRESSURE: 77 MMHG | SYSTOLIC BLOOD PRESSURE: 125 MMHG | TEMPERATURE: 98 F

## 2021-12-23 DIAGNOSIS — Z23 ENCOUNTER FOR IMMUNIZATION: ICD-10-CM

## 2021-12-23 PROCEDURE — 99212 OFFICE O/P EST SF 10 MIN: CPT | Mod: 25

## 2021-12-23 PROCEDURE — 0013A: CPT

## 2021-12-23 NOTE — PLAN
[FreeTextEntry1] : Continue Neurology care for cognitive decline\par appear t be stable\par Received Moderna booster

## 2021-12-23 NOTE — HISTORY OF PRESENT ILLNESS
[Spouse] : spouse [FreeTextEntry1] : F/U cognitive decline\par Covid booster [de-identified] : Reports stable mental status since last seen\par No ER or Urgent care visits\par Need Moderna Booster

## 2021-12-27 ENCOUNTER — RX RENEWAL (OUTPATIENT)
Age: 76
End: 2021-12-27

## 2022-01-04 ENCOUNTER — RX RENEWAL (OUTPATIENT)
Age: 77
End: 2022-01-04

## 2022-01-10 ENCOUNTER — APPOINTMENT (OUTPATIENT)
Dept: BREAST CENTER | Facility: CLINIC | Age: 77
End: 2022-01-10
Payer: MEDICARE

## 2022-01-10 VITALS
BODY MASS INDEX: 31.54 KG/M2 | HEIGHT: 63 IN | WEIGHT: 178 LBS | DIASTOLIC BLOOD PRESSURE: 85 MMHG | SYSTOLIC BLOOD PRESSURE: 155 MMHG | HEART RATE: 69 BPM

## 2022-01-10 DIAGNOSIS — Z80.3 FAMILY HISTORY OF MALIGNANT NEOPLASM OF BREAST: ICD-10-CM

## 2022-01-10 DIAGNOSIS — Z98.890 OTHER SPECIFIED POSTPROCEDURAL STATES: ICD-10-CM

## 2022-01-10 PROCEDURE — 99203 OFFICE O/P NEW LOW 30 MIN: CPT

## 2022-01-10 NOTE — PAST MEDICAL HISTORY
[Postmenopausal] : The patient is postmenopausal [Menarche Age ____] : age at menarche was [unfilled] [unknown] : the patient is unsure of the date of her LMP [Total Preg ___] : G[unfilled] [Live Births ___] : P[unfilled]  [Age At Live Birth ___] : Age at live birth: [unfilled] [History of Hormone Replacement Treatment] : has no history of hormone replacement treatment

## 2022-01-10 NOTE — HISTORY OF PRESENT ILLNESS
[FreeTextEntry1] : Maternal aunt and cousin with breast cancer; family history of colon cancer and Trujillo syndrome\par 12/21 stereotactic biopsy right upper outer quadrant calcifications; flat epithelial atypia\par \par 76-year-old postmenopausal woman with a history of dementia and family history of breast and colorectal cancer as well as Trujillo syndrome, presents after screening mammogram showed a 5 mm focus of calcifications in the upper outer quadrant of the right breast.  Patient underwent stereotactic core biopsy which revealed flat epithelial atypia without any ADH or malignancy.  Patient comes in now for consideration of surveillance and treatment.  20 years ago patient had a benign left breast biopsy.  Patient has never taken hormone replacement therapy.  Patient's maternal cousin and aunt have had breast cancer.  There is a strong family history of colorectal cancer and reports of HNP CC positivity within the family.

## 2022-01-10 NOTE — PHYSICAL EXAM
[No Supraclavicular Adenopathy] : no supraclavicular adenopathy [No Cervical Adenopathy] : no cervical adenopathy [Clear to Auscultation Bilat] : clear to auscultation bilaterally [Normal Sinus Rhythm] : normal sinus rhythm [Normal S1, S2] : normal S1 and S2 [No Rubs] : no pericardial rub [Examined in the supine and seated position] : examined in the supine and seated position [Symmetrical] : symmetrical [No dominant masses] : no dominant masses in right breast  [No dominant masses] : no dominant masses left breast [No Nipple Retraction] : no left nipple retraction [No Nipple Discharge] : no left nipple discharge [No Axillary Lymphadenopathy] : no left axillary lymphadenopathy [No Rashes] : no rashes

## 2022-01-13 ENCOUNTER — RESULT REVIEW (OUTPATIENT)
Age: 77
End: 2022-01-13

## 2022-01-13 ENCOUNTER — APPOINTMENT (OUTPATIENT)
Dept: HEMATOLOGY ONCOLOGY | Facility: CLINIC | Age: 77
End: 2022-01-13
Payer: MEDICARE

## 2022-01-13 VITALS
HEART RATE: 70 BPM | TEMPERATURE: 96.2 F | DIASTOLIC BLOOD PRESSURE: 64 MMHG | OXYGEN SATURATION: 98 % | RESPIRATION RATE: 16 BRPM | BODY MASS INDEX: 32.64 KG/M2 | WEIGHT: 184.25 LBS | HEIGHT: 63 IN | SYSTOLIC BLOOD PRESSURE: 155 MMHG

## 2022-01-13 DIAGNOSIS — Z13.79 ENCOUNTER FOR OTHER SCREENING FOR GENETIC AND CHROMOSOMAL ANOMALIES: ICD-10-CM

## 2022-01-13 PROCEDURE — 99214 OFFICE O/P EST MOD 30 MIN: CPT | Mod: 25

## 2022-01-13 PROCEDURE — 36415 COLL VENOUS BLD VENIPUNCTURE: CPT

## 2022-01-13 NOTE — PHYSICAL EXAM
[Fully active, able to carry on all pre-disease performance without restriction] : Status 0 - Fully active, able to carry on all pre-disease performance without restriction [Normal] : affect appropriate [de-identified] : LE rash [de-identified] : forgetfull

## 2022-01-13 NOTE — ASSESSMENT
[FreeTextEntry1] : 77 yo female referred by Dr. Jennifer Akins for evaluation of thrombocytopenia.\par \par Patient has memory loss and doesn’t recall the details of her condition.\par She denies bleeding, fevers, night sweats, weight loss, recent infections or change in meds.\par I discussed with her  and she is drinking alcohol daily. \par Patient has positive LUCAS in the past. \par Labs were send for inflammatory parameters, nutritional deficiency, immunofixation, immunoglobulins. \par \par Follow up shortly. \par \par 2021\par Patient here with  and sister Ivette ( proxy),  reviewed alcohol intake - both her and her  are drinking. Patient forgetful. \par s/p IV iron, \par Sister states patient and her  are in denial as far alcohol intake. \par Patient herself with memory loss.\par Platelets - 113k, improved. \par LUCAS - weak positive\par Derm evaluation done, scabs and rash LE - biopsy done, obtain results. \par vit B12 borderline - start vit B12 \par Suspected Trujillo syndrome in the family - [facility]ae ordered today\par Breast biopsy- Flat epithelial atypia, to see Dr. Reed.\par \par 2022\par FH \par Father-  MI, colon cancer,  multiple polyps, \par Paternal brother ( Robbie) - colon cancer\par 3 paternal aunts - no cancer\par Paternal GF \par Paternal GM - colon cancer\par Genetic testing MSH6 and TERT  VUS - referred to genetic counselor \par Due for colonoscopy\par Thrombocytopenia - resolution, since patient stopped drinking, plt today 193k. \par Noted increased in WBC 11.03 with ANC- 9.67, patient looks asymptomatic, not septic, will continue surveillance. POssible rebound after bone marrow recovery from ETOH suppression. \par RTC 3 months\par \par

## 2022-01-13 NOTE — PHYSICAL EXAM
[Fully active, able to carry on all pre-disease performance without restriction] : Status 0 - Fully active, able to carry on all pre-disease performance without restriction [Normal] : affect appropriate [de-identified] : LE rash [de-identified] : forgetfull

## 2022-01-13 NOTE — HISTORY OF PRESENT ILLNESS
[de-identified] : 76 year old female presents today for initial consultation of thrombocytopenia, referred by Dr. Akins.  Labs dated 11/2/2021 show a platelet count of 48, other hematological parameters WNL.  She was just placed on Seroquel for agitation secondary to dementia.  Hx difficult to obtain  \par \par She denies any bleeding  [de-identified] : Patient presents today for follow up of thrombocytopenia. She states feeling 'fine' but her RA and b/l LE has a diffuse, red, flat, intact itching rash. She was taking prednisone for the rash and itching but has since finished her dosage. She takes Zyrtec daily with minimal relief.

## 2022-01-13 NOTE — HISTORY OF PRESENT ILLNESS
[de-identified] : 76 year old female presents today for initial consultation of thrombocytopenia, referred by Dr. Akins.  Labs dated 11/2/2021 show a platelet count of 48, other hematological parameters WNL.  She was just placed on Seroquel for agitation secondary to dementia.  Hx difficult to obtain  \par \par She denies any bleeding  [de-identified] : Patient presents today for follow up of thrombocytopenia. She states feeling 'fine' but her RA and b/l LE has a diffuse, red, flat, intact itching rash. She was taking prednisone for the rash and itching but has since finished her dosage. She takes Zyrtec daily with minimal relief.

## 2022-01-28 ENCOUNTER — RESULT REVIEW (OUTPATIENT)
Age: 77
End: 2022-01-28

## 2022-02-02 ENCOUNTER — NON-APPOINTMENT (OUTPATIENT)
Age: 77
End: 2022-02-02

## 2022-02-14 ENCOUNTER — RESULT REVIEW (OUTPATIENT)
Age: 77
End: 2022-02-14

## 2022-02-16 ENCOUNTER — APPOINTMENT (OUTPATIENT)
Dept: GASTROENTEROLOGY | Facility: HOSPITAL | Age: 77
End: 2022-02-16

## 2022-02-16 ENCOUNTER — TRANSCRIPTION ENCOUNTER (OUTPATIENT)
Age: 77
End: 2022-02-16

## 2022-02-28 ENCOUNTER — NON-APPOINTMENT (OUTPATIENT)
Age: 77
End: 2022-02-28

## 2022-02-28 ENCOUNTER — APPOINTMENT (OUTPATIENT)
Dept: CARDIOLOGY | Facility: CLINIC | Age: 77
End: 2022-02-28
Payer: MEDICARE

## 2022-02-28 VITALS
HEIGHT: 63 IN | TEMPERATURE: 98 F | WEIGHT: 186 LBS | BODY MASS INDEX: 32.96 KG/M2 | SYSTOLIC BLOOD PRESSURE: 128 MMHG | DIASTOLIC BLOOD PRESSURE: 60 MMHG

## 2022-02-28 PROCEDURE — 99212 OFFICE O/P EST SF 10 MIN: CPT

## 2022-02-28 PROCEDURE — 93000 ELECTROCARDIOGRAM COMPLETE: CPT

## 2022-02-28 NOTE — DISCUSSION/SUMMARY
[FreeTextEntry1] : Pt continues to do well. No cardiac symptoms.\par No cardiac sx.\par Blood pressure is well controlled.\par Lipid profile is excellent.\par I'm electing to continue the same medications at this time.\par Today's EKG is nl.\par

## 2022-02-28 NOTE — REASON FOR VISIT
[FreeTextEntry1] : The pt is followed with the principal dx of htn, and hyperlipidemia.\par She describes no cardiac sx.\par She is feeling entirely well.\par No chest pain, dyspnea, palpitations, sob.

## 2022-02-28 NOTE — REVIEW OF SYSTEMS
[Negative] : Heme/Lymph [de-identified] : The pt was referred for hematology consultation prev. bec. of thrombocytopenia. The last plt ct. is 193.

## 2022-03-01 ENCOUNTER — RX RENEWAL (OUTPATIENT)
Age: 77
End: 2022-03-01

## 2022-03-01 ENCOUNTER — NON-APPOINTMENT (OUTPATIENT)
Age: 77
End: 2022-03-01

## 2022-03-22 ENCOUNTER — RX RENEWAL (OUTPATIENT)
Age: 77
End: 2022-03-22

## 2022-03-25 ENCOUNTER — APPOINTMENT (OUTPATIENT)
Dept: ENDOCRINOLOGY | Facility: CLINIC | Age: 77
End: 2022-03-25
Payer: MEDICARE

## 2022-03-25 VITALS
HEART RATE: 78 BPM | DIASTOLIC BLOOD PRESSURE: 70 MMHG | BODY MASS INDEX: 32.96 KG/M2 | SYSTOLIC BLOOD PRESSURE: 118 MMHG | HEIGHT: 63 IN | WEIGHT: 186 LBS | OXYGEN SATURATION: 98 %

## 2022-03-25 LAB
25(OH)D3 SERPL-MCNC: 33.7 NG/ML
ANION GAP SERPL CALC-SCNC: 12 MMOL/L
BUN SERPL-MCNC: 23 MG/DL
CALCIUM SERPL-MCNC: 9.6 MG/DL
CALCIUM SERPL-MCNC: 9.6 MG/DL
CHLORIDE SERPL-SCNC: 105 MMOL/L
CO2 SERPL-SCNC: 26 MMOL/L
CREAT SERPL-MCNC: 0.97 MG/DL
EGFR: 59 ML/MIN/1.73M2
GLUCOSE SERPL-MCNC: 92 MG/DL
MAGNESIUM SERPL-MCNC: 2.2 MG/DL
PARATHYROID HORMONE INTACT: 47 PG/ML
PHOSPHATE SERPL-MCNC: 4.4 MG/DL
POTASSIUM SERPL-SCNC: 4.5 MMOL/L
SODIUM SERPL-SCNC: 144 MMOL/L
T4 FREE SERPL-MCNC: 1.1 NG/DL
TSH SERPL-ACNC: 3.78 UIU/ML

## 2022-03-25 PROCEDURE — G0447 BEHAVIOR COUNSEL OBESITY 15M: CPT | Mod: 59

## 2022-03-25 PROCEDURE — 99215 OFFICE O/P EST HI 40 MIN: CPT | Mod: 25

## 2022-03-27 NOTE — HISTORY OF PRESENT ILLNESS
[FreeTextEntry1] :  78 yo WW from [facility] coming for f/u hypothyroidism, osteoporosis strong family history of thyroid problems\par started to have serious memory problems on therapy with Dr Thornton \par today she came with her  due to her memory problems \par also treated by  Dr Gail Thornton for demnetia \par        hyperlipidemia, stopped Pravastatin as she had memory problems with it, still has it with Rosuvatstatin 5mg qhs memory problems are the same \par        also Dr Oneil for HTN\par        feels better since started gardening \par \par recent right hip replacement feels better can move more \par        9/16 had neck laminectomy, had infection and allergy to the suture material , has had multiple herniated disks\par        has been on thyroid supplement since her 20's , \par        Levothyroxine  137mcg labs reviewed nhightTFTs \par        labs reviewed good  \par        had thyroid US 1980's was fine, repeated 2011 small thyroid, no nodules\par        denies FHx thyroid cancer\par        had forehead radiation 1948 for a skin lesion\par        has on and off dysphagia\par        had EGD Dr Lynn \par        has no dysphonia\par        denies dyspnea\par        thyroid US 2011 small thyroid no nodules\par        labs reviewed high normal thyroid, finally low normal Vit D was low prior\par \par \par thyroid US 2011 MPRESSION: No focal nodules. Small heterogeneous thyroid gland similar to prior sonogram consistent with chronic thyroiditis.\par dexa scan 2011 normal \par has chronic fatigue sees also Cardiology

## 2022-03-27 NOTE — REVIEW OF SYSTEMS
[Recent Weight Gain (___ Lbs)] : recent weight gain: [unfilled] lbs [Depression] : depression [Anxiety] : anxiety [Negative] : Heme/Lymph [de-identified] : memory problems

## 2022-04-05 ENCOUNTER — NON-APPOINTMENT (OUTPATIENT)
Age: 77
End: 2022-04-05

## 2022-04-14 ENCOUNTER — APPOINTMENT (OUTPATIENT)
Dept: HEMATOLOGY ONCOLOGY | Facility: CLINIC | Age: 77
End: 2022-04-14
Payer: MEDICARE

## 2022-04-14 ENCOUNTER — RESULT REVIEW (OUTPATIENT)
Age: 77
End: 2022-04-14

## 2022-04-14 VITALS
TEMPERATURE: 98.8 F | OXYGEN SATURATION: 96 % | DIASTOLIC BLOOD PRESSURE: 72 MMHG | SYSTOLIC BLOOD PRESSURE: 129 MMHG | HEART RATE: 70 BPM | BODY MASS INDEX: 33.15 KG/M2 | WEIGHT: 187.1 LBS | RESPIRATION RATE: 18 BRPM | HEIGHT: 62.99 IN

## 2022-04-14 PROCEDURE — 36415 COLL VENOUS BLD VENIPUNCTURE: CPT

## 2022-04-14 PROCEDURE — 99214 OFFICE O/P EST MOD 30 MIN: CPT | Mod: 25

## 2022-04-14 NOTE — ASSESSMENT
[FreeTextEntry1] : 76 yo female referred by Dr. Jennifer Akins for evaluation of thrombocytopenia.\par \par Patient has memory loss and doesn’t recall the details of her condition.\par She denies bleeding, fevers, night sweats, weight loss, recent infections or change in meds.\par I discussed with her  and she is drinking alcohol daily. \par Patient has positive LUCAS in the past. \par Labs were send for inflammatory parameters, nutritional deficiency, immunofixation, immunoglobulins. \par \par Follow up shortly. \par \par 2021\par Patient here with  and sister Ivette ( proxy),  reviewed alcohol intake - both her and her  are drinking. Patient forgetful. \par s/p IV iron, \par Sister states patient and her  are in denial as far alcohol intake. \par Patient herself with memory loss.\par Platelets - 113k, improved. \par LUCAS - weak positive\par Derm evaluation done, scabs and rash LE - biopsy done, obtain results. \par vit B12 borderline - start vit B12 \par Suspected Trujillo syndrome in the family - [facility] ordered today\par Breast biopsy- Flat epithelial atypia, to see Dr. Reed.\par \par 2022\par FH \par Father-  MI, colon cancer,  multiple polyps, \par Paternal brother ( Robbie) - colon cancer\par 3 paternal aunts - no cancer\par Paternal GF \par Paternal GM - colon cancer\par Genetic testing MSH6 and TERT  VUS - referred to genetic counselor \par Due for colonoscopy\par Thrombocytopenia - resolution, since patient stopped drinking, plt today 193k. \par Noted increased in WBC 11.03 with ANC- 9.67, patient looks asymptomatic, not septic, will continue surveillance. Possible rebound after bone marrow recovery from ETOH suppression. \par RTC 3 months\par \par 2022\par Today with sister who provides the history \par Colonoscopy - 1 month ago, WNL \par Thrombocytopenia - decline in platelets, started on depakote 1 weeks ago fort behavioral issues, stopped drinking alcohol. Reviewed that depakote might cause thrombocytopenia, repeat CBC shortly.\par If recurrent thrombocytopenia will proceed with bone marrow biopsy.\par Patient referred with sister to genetic counselor regarding Trujillo syndrome.   \par Blood drawn in office. Ordered and reviewed.\par \par \par

## 2022-04-14 NOTE — HISTORY OF PRESENT ILLNESS
[de-identified] : 76 year old female presents today for initial consultation of thrombocytopenia, referred by Dr. Akins.  Labs dated 11/2/2021 show a platelet count of 48, other hematological parameters WNL.  She was just placed on Seroquel for agitation secondary to dementia.  Hx difficult to obtain  \par \par She denies any bleeding  [de-identified] : Patient presents today for follow up of thrombocytopenia. She states feeling 'fine' but her RA and b/l LE has a diffuse, red, flat, intact itching rash. She was taking prednisone for the rash and itching but has since finished her dosage. She takes Zyrtec daily with minimal relief.

## 2022-05-06 ENCOUNTER — RESULT REVIEW (OUTPATIENT)
Age: 77
End: 2022-05-06

## 2022-05-06 ENCOUNTER — APPOINTMENT (OUTPATIENT)
Dept: BREAST CENTER | Facility: CLINIC | Age: 77
End: 2022-05-06
Payer: MEDICARE

## 2022-05-06 VITALS
DIASTOLIC BLOOD PRESSURE: 77 MMHG | BODY MASS INDEX: 34.04 KG/M2 | HEART RATE: 60 BPM | OXYGEN SATURATION: 97 % | HEIGHT: 62 IN | SYSTOLIC BLOOD PRESSURE: 143 MMHG | WEIGHT: 185 LBS

## 2022-05-06 PROCEDURE — 99213 OFFICE O/P EST LOW 20 MIN: CPT

## 2022-05-09 NOTE — PAST MEDICAL HISTORY
[Postmenopausal] : The patient is postmenopausal [Menarche Age ____] : age at menarche was [unfilled] [unknown] : the patient is unsure of the date of her LMP [Total Preg ___] : G[unfilled] [Live Births ___] : P[unfilled]  [History of Hormone Replacement Treatment] : has no history of hormone replacement treatment [Age At Live Birth ___] : Age at live birth: [unfilled]

## 2022-05-09 NOTE — HISTORY OF PRESENT ILLNESS
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  Follow-up right diagnostic mammogram results were performed today but the results of which are pending.\par \par 76-year-old postmenopausal woman with a history of dementia and family history of breast and colorectal cancer as well as Trujillo syndrome, presents after screening mammogram showed a 5 mm focus of calcifications in the upper outer quadrant of the right breast.  Patient underwent stereotactic core biopsy which revealed flat epithelial atypia without any ADH or malignancy.  Patient comes in now for consideration of surveillance and treatment.  20 years ago patient had a benign left breast biopsy.  Patient has never taken hormone replacement therapy.  Patient's maternal cousin and aunt have had breast cancer.  There is a strong family history of colorectal cancer and reports of HNP CC positivity within the family.

## 2022-05-12 ENCOUNTER — APPOINTMENT (OUTPATIENT)
Dept: HEMATOLOGY ONCOLOGY | Facility: CLINIC | Age: 77
End: 2022-05-12
Payer: MEDICARE

## 2022-05-12 ENCOUNTER — RESULT REVIEW (OUTPATIENT)
Age: 77
End: 2022-05-12

## 2022-05-12 VITALS
HEIGHT: 62 IN | OXYGEN SATURATION: 99 % | TEMPERATURE: 99.3 F | HEART RATE: 82 BPM | DIASTOLIC BLOOD PRESSURE: 70 MMHG | SYSTOLIC BLOOD PRESSURE: 137 MMHG | WEIGHT: 188.25 LBS | BODY MASS INDEX: 34.64 KG/M2 | RESPIRATION RATE: 16 BRPM

## 2022-05-12 PROCEDURE — 36415 COLL VENOUS BLD VENIPUNCTURE: CPT

## 2022-05-12 PROCEDURE — 99213 OFFICE O/P EST LOW 20 MIN: CPT | Mod: 25

## 2022-05-12 NOTE — PHYSICAL EXAM
[Fully active, able to carry on all pre-disease performance without restriction] : Status 0 - Fully active, able to carry on all pre-disease performance without restriction [Normal] : affect appropriate [de-identified] : LE rash [de-identified] : forgetfull

## 2022-05-12 NOTE — ASSESSMENT
[FreeTextEntry1] : 76 yo female referred by Dr. Jennifer Akins for evaluation of thrombocytopenia.  Labs were send for inflammatory parameters, nutritional deficiency, immunofixation, immunoglobulins. \par \par Patient has memory loss and doesn’t recall the details of her condition.\par She denies bleeding, fevers, night sweats, weight loss, recent infections or change in meds.\par I discussed with her  and she is drinking alcohol daily- both her and her spouse have memory impairment- sister comes to visit to provide hx- pt reports she has stopped drinking Ivette ( proxy)\par Patient has positive LUCAS in the past. \par Breast biopsy- Flat epithelial atypia, to see Dr. Reed.\par vit B12 borderline - start vit B12 \par MELVINA- s/p IV iron, \par Suspected Trujillo syndrome in the family - Invitae ordered today\par FH \par Father-  MI, colon cancer,  multiple polyps, \par Paternal brother ( Robbie) - colon cancer\par 3 paternal aunts - no cancer\par Paternal GF \par Paternal GM - colon cancer\par Genetic testing MSH6 and TERT  VUS - referred to genetic counselor- patient and sister refused to see genetic counselor \par Plts 101k no evidence of bleeding- on Depakote since \par \par Labs ordered, drawn office- reviewed\par

## 2022-05-12 NOTE — HISTORY OF PRESENT ILLNESS
[de-identified] : 76 year old female presents today for initial consultation of thrombocytopenia, referred by Dr. Akins.  Labs dated 11/2/2021 show a platelet count of 48, other hematological parameters WNL.  She was just placed on Seroquel for agitation secondary to dementia.  Hx difficult to obtain  \par \par She denies any bleeding  [de-identified] : Patient presents today for follow up of thrombocytopenia. She states feeling 'fine'. She started having seasonal allergies within the past few days, taking sudafed.  No recent illness or infections.

## 2022-05-16 ENCOUNTER — APPOINTMENT (OUTPATIENT)
Dept: FAMILY MEDICINE | Facility: CLINIC | Age: 77
End: 2022-05-16
Payer: MEDICARE

## 2022-05-16 VITALS
TEMPERATURE: 98.8 F | DIASTOLIC BLOOD PRESSURE: 82 MMHG | WEIGHT: 185 LBS | BODY MASS INDEX: 34.04 KG/M2 | HEART RATE: 62 BPM | HEIGHT: 62 IN | SYSTOLIC BLOOD PRESSURE: 128 MMHG | RESPIRATION RATE: 16 BRPM

## 2022-05-16 DIAGNOSIS — R05.9 COUGH, UNSPECIFIED: ICD-10-CM

## 2022-05-16 DIAGNOSIS — K21.9 GASTRO-ESOPHAGEAL REFLUX DISEASE W/OUT ESOPHAGITIS: ICD-10-CM

## 2022-05-16 PROCEDURE — 99213 OFFICE O/P EST LOW 20 MIN: CPT

## 2022-05-16 NOTE — HISTORY OF PRESENT ILLNESS
[FreeTextEntry8] : Persistent cough at night\par History of "head cold" 3-4 weeks ago\par Tested Covid neg at home

## 2022-07-12 ENCOUNTER — APPOINTMENT (OUTPATIENT)
Dept: HEMATOLOGY ONCOLOGY | Facility: CLINIC | Age: 77
End: 2022-07-12

## 2022-07-12 ENCOUNTER — RESULT REVIEW (OUTPATIENT)
Age: 77
End: 2022-07-12

## 2022-07-12 VITALS
HEIGHT: 62 IN | HEART RATE: 75 BPM | DIASTOLIC BLOOD PRESSURE: 74 MMHG | WEIGHT: 185.38 LBS | TEMPERATURE: 97.5 F | SYSTOLIC BLOOD PRESSURE: 144 MMHG | OXYGEN SATURATION: 96 % | RESPIRATION RATE: 16 BRPM | BODY MASS INDEX: 34.11 KG/M2

## 2022-07-12 PROCEDURE — 36415 COLL VENOUS BLD VENIPUNCTURE: CPT

## 2022-07-12 PROCEDURE — 99214 OFFICE O/P EST MOD 30 MIN: CPT | Mod: 25

## 2022-07-12 NOTE — ASSESSMENT
[FreeTextEntry1] : 76 yo female referred by Dr. Jennifer Akins for evaluation of thrombocytopenia.  Labs were send for inflammatory parameters, nutritional deficiency, immunofixation, immunoglobulins. \par \par Patient has memory loss and doesn’t recall the details of her condition.\par She denies bleeding, fevers, night sweats, weight loss, recent infections or change in meds.\par I discussed with her  and she is drinking alcohol daily- both her and her spouse have memory impairment- sister comes to visit to provide hx- pt reports she has stopped drinking Ivette ( proxy)\par Patient has positive LUCAS in the past. \par Breast biopsy- Flat epithelial atypia, to see Dr. Reed.\par vit B12 borderline - start vit B12 \par MELVINA- s/p IV iron, Ferritin 241\par \par # Thrombocytopenia - referred for bone marrow biopsy \par \par FH \par Father-  MI, colon cancer,  multiple polyps, \par Paternal brother ( Robbie) - colon cancer\par 3 paternal aunts - no cancer\par Paternal GF \par Paternal GM - colon cancer\par Genetic testing MSH6 and TERT  VUS - referred to genetic counselor- patient and sister refused to see genetic counselor \par Plts 101k no evidence of bleeding- on Depakote since \par \par Labs ordered, drawn office- reviewed\par

## 2022-07-12 NOTE — PHYSICAL EXAM
[Fully active, able to carry on all pre-disease performance without restriction] : Status 0 - Fully active, able to carry on all pre-disease performance without restriction [Normal] : affect appropriate [de-identified] : LE rash [de-identified] : forgetfull

## 2022-07-12 NOTE — HISTORY OF PRESENT ILLNESS
[de-identified] : 76 year old female presents today for initial consultation of thrombocytopenia, referred by Dr. Akins.  Labs dated 11/2/2021 show a platelet count of 48, other hematological parameters WNL.  She was just placed on Seroquel for agitation secondary to dementia.  Hx difficult to obtain  \par \par She denies any bleeding  [de-identified] : Patient presents today for follow up of thrombocytopenia. She states feeling 'fine'. She started having seasonal allergies within the past few days, taking sudafed.  No recent illness or infections.

## 2022-07-15 ENCOUNTER — RESULT REVIEW (OUTPATIENT)
Age: 77
End: 2022-07-15

## 2022-07-17 ENCOUNTER — RESULT REVIEW (OUTPATIENT)
Age: 77
End: 2022-07-17

## 2022-07-18 ENCOUNTER — RESULT REVIEW (OUTPATIENT)
Age: 77
End: 2022-07-18

## 2022-08-08 ENCOUNTER — RESULT REVIEW (OUTPATIENT)
Age: 77
End: 2022-08-08

## 2022-08-08 ENCOUNTER — APPOINTMENT (OUTPATIENT)
Dept: HEMATOLOGY ONCOLOGY | Facility: CLINIC | Age: 77
End: 2022-08-08

## 2022-08-08 VITALS
WEIGHT: 186 LBS | RESPIRATION RATE: 16 BRPM | HEART RATE: 72 BPM | BODY MASS INDEX: 34.23 KG/M2 | TEMPERATURE: 97.6 F | DIASTOLIC BLOOD PRESSURE: 66 MMHG | HEIGHT: 62 IN | OXYGEN SATURATION: 97 % | SYSTOLIC BLOOD PRESSURE: 114 MMHG

## 2022-08-08 DIAGNOSIS — L28.2 OTHER PRURIGO: ICD-10-CM

## 2022-08-08 PROCEDURE — 99214 OFFICE O/P EST MOD 30 MIN: CPT | Mod: 25

## 2022-08-08 PROCEDURE — 36415 COLL VENOUS BLD VENIPUNCTURE: CPT

## 2022-08-08 RX ORDER — DIVALPROEX SODIUM 125 MG/1
125 TABLET, DELAYED RELEASE ORAL
Refills: 0 | Status: COMPLETED | COMMUNITY
End: 2022-08-08

## 2022-08-08 NOTE — HISTORY OF PRESENT ILLNESS
[de-identified] : 76 year old female presents today for initial consultation of thrombocytopenia, referred by Dr. Akins.  Labs dated 11/2/2021 show a platelet count of 48, other hematological parameters WNL.  She was just placed on Seroquel for agitation secondary to dementia.  Hx difficult to obtain  \par \par She denies any bleeding  [de-identified] : Patient presents today for follow up of thrombocytopenia. She states feeling 'fine'. She started having seasonal allergies within the past few days, taking sudafed.  No recent illness or infections.

## 2022-08-08 NOTE — ASSESSMENT
[FreeTextEntry1] : 76 yo female referred by Dr. eJnnifer Akins for evaluation of thrombocytopenia.  Labs were send for inflammatory parameters, nutritional deficiency, immunofixation, immunoglobulins. \par \par Patient has memory loss and doesn’t recall the details of her condition.\par She denies bleeding, fevers, night sweats, weight loss, recent infections or change in meds.\par I discussed with her  and she is drinking alcohol daily- both her and her spouse have memory impairment- sister comes to visit to provide hx- pt reports she has stopped drinking Ivette ( proxy)\par Patient has positive LUCAS in the past. \par Breast biopsy- Flat epithelial atypia, to see Dr. 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 Reviewed presentation of thrombocytopenia, bleeding, petechiae.  Monitor q 3 months, treatment if platelets less then 30k.  \par \par FH \par Father-  MI, colon cancer,  multiple polyps, \par Paternal brother ( Robbie) - colon cancer\par 3 paternal aunts - no cancer\par Paternal GF \par Paternal GM - colon cancer\par Genetic testing MSH6 and TERT  VUS - referred to genetic counselor- patient and sister refused to see genetic counselor \par \par Labs ordered, drawn office- reviewed\par

## 2022-08-08 NOTE — PHYSICAL EXAM
[Fully active, able to carry on all pre-disease performance without restriction] : Status 0 - Fully active, able to carry on all pre-disease performance without restriction [Normal] : affect appropriate [de-identified] : forgetfull [de-identified] : LE rash

## 2022-08-16 ENCOUNTER — NON-APPOINTMENT (OUTPATIENT)
Age: 77
End: 2022-08-16

## 2022-08-17 ENCOUNTER — APPOINTMENT (OUTPATIENT)
Dept: ENDOCRINOLOGY | Facility: CLINIC | Age: 77
End: 2022-08-17

## 2022-08-17 VITALS
WEIGHT: 184 LBS | HEART RATE: 94 BPM | DIASTOLIC BLOOD PRESSURE: 62 MMHG | HEIGHT: 61 IN | SYSTOLIC BLOOD PRESSURE: 118 MMHG | BODY MASS INDEX: 34.74 KG/M2 | OXYGEN SATURATION: 95 %

## 2022-08-17 LAB
24R-OH-CALCIDIOL SERPL-MCNC: 50.9 PG/ML
25(OH)D3 SERPL-MCNC: 67.5 NG/ML
ALBUMIN SERPL ELPH-MCNC: 4.2 G/DL
ALP BLD-CCNC: 137 U/L
ALT SERPL-CCNC: 9 U/L
ANION GAP SERPL CALC-SCNC: 11 MMOL/L
AST SERPL-CCNC: 14 U/L
BILIRUB SERPL-MCNC: 0.5 MG/DL
BUN SERPL-MCNC: 19 MG/DL
CALCIUM SERPL-MCNC: 10 MG/DL
CALCIUM SERPL-MCNC: 10 MG/DL
CHLORIDE SERPL-SCNC: 105 MMOL/L
CHOLEST SERPL-MCNC: 187 MG/DL
CO2 SERPL-SCNC: 27 MMOL/L
COLLAGEN CTX SERPL-MCNC: 345 PG/ML
CREAT SERPL-MCNC: 1.15 MG/DL
EGFR: 48 ML/MIN/1.73M2
ESTIMATED AVERAGE GLUCOSE: 103 MG/DL
GLUCOSE SERPL-MCNC: 98 MG/DL
HBA1C MFR BLD HPLC: 5.2 %
HDLC SERPL-MCNC: 50 MG/DL
LDLC SERPL CALC-MCNC: 114 MG/DL
MAGNESIUM SERPL-MCNC: 2.2 MG/DL
NONHDLC SERPL-MCNC: 137 MG/DL
PARATHYROID HORMONE INTACT: 58 PG/ML
PHOSPHATE SERPL-MCNC: 3.2 MG/DL
POTASSIUM SERPL-SCNC: 4.3 MMOL/L
PROT SERPL-MCNC: 6.5 G/DL
SODIUM SERPL-SCNC: 143 MMOL/L
T4 FREE SERPL-MCNC: 1.5 NG/DL
TRIGL SERPL-MCNC: 116 MG/DL
TSH SERPL-ACNC: 3.19 UIU/ML

## 2022-08-17 PROCEDURE — 99215 OFFICE O/P EST HI 40 MIN: CPT

## 2022-08-17 PROCEDURE — G0447 BEHAVIOR COUNSEL OBESITY 15M: CPT | Mod: 59

## 2022-08-17 RX ORDER — ESCITALOPRAM OXALATE 10 MG/1
10 TABLET ORAL
Refills: 0 | Status: COMPLETED | COMMUNITY
Start: 2021-07-22 | End: 2022-08-17

## 2022-08-17 RX ORDER — QUETIAPINE FUMARATE 25 MG/1
25 TABLET ORAL
Qty: 60 | Refills: 1 | Status: DISCONTINUED | COMMUNITY
Start: 2021-11-09 | End: 2022-08-17

## 2022-08-17 NOTE — REVIEW OF SYSTEMS
[Recent Weight Gain (___ Lbs)] : recent weight gain: [unfilled] lbs [Depression] : depression [Anxiety] : anxiety [Negative] : Heme/Lymph [de-identified] : memory problems

## 2022-08-17 NOTE — PHYSICAL EXAM
[Healthy Appearance] : healthy appearance [Obese] : obese [No Acute Distress] : no acute distress [Well Developed] : well developed [Normal Voice/Communication] : normal voice communication [Normal Sclera/Conjunctiva] : normal sclera/conjunctiva [Normal Outer Ear/Nose] : the ears and nose were normal in appearance [Normal Gait] : normal gait [Normal Affect] : the affect was normal [Normal Mood] : the mood was normal [de-identified] : loss of hearing  [de-identified] : left thyroid nodule? 1cm, mobile with swallowing  [de-identified] : knee replacement scars b/l  [de-identified] : small pinches, per sister does pick her skin a lot

## 2022-08-17 NOTE — HISTORY OF PRESENT ILLNESS
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\par today she came with her sister  due to her memory problems \par also treated by  Dr Gail Thornton for demnetia \par        hyperlipidemia, stopped Pravastatin as she had memory problems with it, still has it with Rosuvatstatin 5mg qhs memory problems are the same \par        also Dr Oneil for HTN\par        feels better since started gardening \par \par recent right hip replacement feels better can move more \par        9/16 had neck laminectomy, had infection and allergy to the suture material , has had multiple herniated disks\par        has been on thyroid supplement since her 20's , \par        Levothyroxine  137mcg labs reviewed nhightTFTs \par        labs reviewed good  \par        had thyroid US 1980's was fine, repeated 2011 small thyroid, no nodules\par        denies FHx thyroid cancer\par        had forehead radiation 1948 for a skin lesion\par        has on and off dysphagia\par        had EGD Dr Lynn \par        has no dysphonia\par        denies dyspnea\par        thyroid US 2011 small thyroid no nodules\par        labs reviewed high normal thyroid, finally low normal Vit D was low prior\par \par \par thyroid US 2011 MPRESSION: No focal nodules. Small heterogeneous thyroid gland similar to prior sonogram consistent with chronic thyroiditis.\par dexa scan 2011 normal \par has chronic fatigue sees also Cardiology

## 2022-09-09 ENCOUNTER — RESULT REVIEW (OUTPATIENT)
Age: 77
End: 2022-09-09

## 2022-11-07 ENCOUNTER — APPOINTMENT (OUTPATIENT)
Dept: HEMATOLOGY ONCOLOGY | Facility: CLINIC | Age: 77
End: 2022-11-07

## 2022-11-07 ENCOUNTER — RESULT REVIEW (OUTPATIENT)
Age: 77
End: 2022-11-07

## 2022-11-07 VITALS
HEART RATE: 56 BPM | TEMPERATURE: 98.1 F | RESPIRATION RATE: 18 BRPM | SYSTOLIC BLOOD PRESSURE: 121 MMHG | HEIGHT: 61 IN | BODY MASS INDEX: 35.35 KG/M2 | DIASTOLIC BLOOD PRESSURE: 65 MMHG | OXYGEN SATURATION: 99 % | WEIGHT: 187.25 LBS

## 2022-11-07 PROCEDURE — 99214 OFFICE O/P EST MOD 30 MIN: CPT | Mod: 25

## 2022-11-07 PROCEDURE — 36415 COLL VENOUS BLD VENIPUNCTURE: CPT

## 2022-11-07 NOTE — ASSESSMENT
[FreeTextEntry1] : 78 yo female referred by Dr. Jennifer Akins for evaluation of thrombocytopenia.  Labs were send for inflammatory parameters, nutritional deficiency, immunofixation, immunoglobulins. \par \par Patient has memory loss and doesn’t recall the details of her condition.\par She denies bleeding, fevers, night sweats, weight loss, recent infections or change in meds.\par I discussed with her  and she is drinking alcohol daily- both her and her spouse have memory impairment- sister comes to visit to provide hx- pt reports she has stopped drinking Ivette ( proxy)\par Patient has positive LUCAS in the past. \par \par # Breast biopsy- Flat epithelial atypia, Dr. Ramesh\par - continue surveillance.\par - mammogram due now \par \par # vit B12 borderline - start vit B12 \par \par #MELVINA- s/p IV iron, Ferritin 241\par \par # Thrombocytopenia - bone marrow biopsy, results from  reviewed - c/w ITP, d/w patient and family (  and sister). Reviewed presentation of thrombocytopenia, bleeding, petechiae.  Monitor q 3 months, treatment if platelets less then 30k.  \par \par # Memory loss- here with  \par \par FH \par Father-  MI, colon cancer,  multiple polyps, \par Paternal brother ( Robbie) - colon cancer\par 3 paternal aunts - no cancer\par Paternal GF \par Paternal GM - colon cancer\par Genetic testing MSH6 and TERT  VUS - referred to genetic counselor- patient and sister refused to see genetic counselor \par \par Labs ordered, drawn office- reviewed\par \par RTC 4 months \par

## 2022-11-07 NOTE — HISTORY OF PRESENT ILLNESS
[de-identified] : 76 year old female presents today for initial consultation of thrombocytopenia, referred by Dr. Akins.  Labs dated 11/2/2021 show a platelet count of 48, other hematological parameters WNL.  She was just placed on Seroquel for agitation secondary to dementia.  Hx difficult to obtain  \par \par She denies any bleeding  [de-identified] : Patient presents today for follow up of thrombocytopenia. She states feeling 'fine'. She started having seasonal allergies within the past few days, taking sudafed.  No recent illness or infections.

## 2022-11-08 ENCOUNTER — RX RENEWAL (OUTPATIENT)
Age: 77
End: 2022-11-08

## 2022-11-08 DIAGNOSIS — N60.11 DIFFUSE CYSTIC MASTOPATHY OF LEFT BREAST: ICD-10-CM

## 2022-11-08 DIAGNOSIS — N60.12 DIFFUSE CYSTIC MASTOPATHY OF LEFT BREAST: ICD-10-CM

## 2022-11-09 ENCOUNTER — RESULT REVIEW (OUTPATIENT)
Age: 77
End: 2022-11-09

## 2022-11-09 ENCOUNTER — APPOINTMENT (OUTPATIENT)
Dept: BREAST CENTER | Facility: CLINIC | Age: 77
End: 2022-11-09

## 2022-11-09 VITALS — WEIGHT: 185 LBS | BODY MASS INDEX: 34.04 KG/M2 | HEIGHT: 62 IN

## 2022-11-09 PROCEDURE — 99213 OFFICE O/P EST LOW 20 MIN: CPT

## 2022-11-09 NOTE — HISTORY OF PRESENT ILLNESS
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  Today's mammogram was unremarkable, BI-RADS 2.\par \par 76-year-old postmenopausal woman with a history of dementia and family history of breast and colorectal cancer as well as Trujillo syndrome, presents after screening mammogram showed a 5 mm focus of calcifications in the upper outer quadrant of the right breast.  Patient underwent stereotactic core biopsy which revealed flat epithelial atypia without any ADH or malignancy.  Patient comes in now for consideration of surveillance and treatment.  20 years ago patient had a benign left breast biopsy.  Patient has never taken hormone replacement therapy.  Patient's maternal cousin and aunt have had breast cancer.  There is a strong family history of colorectal cancer and reports of HNP CC positivity within the family.

## 2022-12-19 ENCOUNTER — RX RENEWAL (OUTPATIENT)
Age: 77
End: 2022-12-19

## 2023-01-05 ENCOUNTER — APPOINTMENT (OUTPATIENT)
Dept: OBGYN | Facility: CLINIC | Age: 78
End: 2023-01-05

## 2023-01-06 ENCOUNTER — APPOINTMENT (OUTPATIENT)
Dept: ENDOCRINOLOGY | Facility: CLINIC | Age: 78
End: 2023-01-06
Payer: MEDICARE

## 2023-01-06 VITALS
HEIGHT: 61.5 IN | SYSTOLIC BLOOD PRESSURE: 122 MMHG | OXYGEN SATURATION: 99 % | BODY MASS INDEX: 34.48 KG/M2 | DIASTOLIC BLOOD PRESSURE: 70 MMHG | HEART RATE: 55 BPM | WEIGHT: 185 LBS

## 2023-01-06 LAB
24R-OH-CALCIDIOL SERPL-MCNC: 79.1 PG/ML
25(OH)D3 SERPL-MCNC: 63.9 NG/ML
ALBUMIN MFR SERPL ELPH: 61.3 %
ALBUMIN SERPL ELPH-MCNC: 4 G/DL
ALBUMIN SERPL-MCNC: 3.7 G/DL
ALBUMIN/GLOB SERPL: 1.6 RATIO
ALP BLD-CCNC: 98 U/L
ALPHA1 GLOB MFR SERPL ELPH: 4.8 %
ALPHA1 GLOB SERPL ELPH-MCNC: 0.3 G/DL
ALPHA2 GLOB MFR SERPL ELPH: 10.4 %
ALPHA2 GLOB SERPL ELPH-MCNC: 0.6 G/DL
ALT SERPL-CCNC: 11 U/L
ANION GAP SERPL CALC-SCNC: 10 MMOL/L
AST SERPL-CCNC: 11 U/L
B-GLOBULIN MFR SERPL ELPH: 10.7 %
B-GLOBULIN SERPL ELPH-MCNC: 0.6 G/DL
BILIRUB SERPL-MCNC: 0.3 MG/DL
BUN SERPL-MCNC: 16 MG/DL
CALCIUM SERPL-MCNC: 9.7 MG/DL
CALCIUM SERPL-MCNC: 9.7 MG/DL
CHLORIDE SERPL-SCNC: 107 MMOL/L
CHOLEST SERPL-MCNC: 244 MG/DL
CO2 SERPL-SCNC: 28 MMOL/L
COLLAGEN CTX SERPL-MCNC: 453 PG/ML
CREAT SERPL-MCNC: 1.05 MG/DL
EGFR: 54 ML/MIN/1.73M2
ESTIMATED AVERAGE GLUCOSE: 114 MG/DL
GAMMA GLOB FLD ELPH-MCNC: 0.8 G/DL
GAMMA GLOB MFR SERPL ELPH: 12.8 %
GLUCOSE SERPL-MCNC: 96 MG/DL
HBA1C MFR BLD HPLC: 5.6 %
HDLC SERPL-MCNC: 55 MG/DL
INTERPRETATION SERPL IEP-IMP: NORMAL
LDLC SERPL CALC-MCNC: 168 MG/DL
MAGNESIUM SERPL-MCNC: 2 MG/DL
NONHDLC SERPL-MCNC: 189 MG/DL
PARATHYROID HORMONE INTACT: 54 PG/ML
PHOSPHATE SERPL-MCNC: 3.3 MG/DL
POTASSIUM SERPL-SCNC: 4.6 MMOL/L
PROT SERPL-MCNC: 6 G/DL
SODIUM SERPL-SCNC: 144 MMOL/L
T4 FREE SERPL-MCNC: 1.4 NG/DL
TRIGL SERPL-MCNC: 104 MG/DL
TSH SERPL-ACNC: 0.69 UIU/ML

## 2023-01-06 PROCEDURE — 99215 OFFICE O/P EST HI 40 MIN: CPT | Mod: 25

## 2023-01-06 PROCEDURE — G0447 BEHAVIOR COUNSEL OBESITY 15M: CPT | Mod: 59

## 2023-01-06 RX ORDER — HYDROXYZINE HYDROCHLORIDE 10 MG/1
10 TABLET ORAL 4 TIMES DAILY
Qty: 120 | Refills: 1 | Status: DISCONTINUED | COMMUNITY
Start: 2021-09-20 | End: 2023-01-06

## 2023-01-08 NOTE — REVIEW OF SYSTEMS
[Recent Weight Gain (___ Lbs)] : recent weight gain: [unfilled] lbs [Depression] : depression [Anxiety] : anxiety [Negative] : Heme/Lymph [de-identified] : memory problems

## 2023-01-08 NOTE — HISTORY OF PRESENT ILLNESS
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\par today she came with her sister  due to her memory problems \par also treated by  Dr Gail Thornton for demnetia \par        hyperlipidemia, stopped Pravastatin as she had memory problems with it, still has it with Rosuvatstatin 5mg qhs memory problems are the same \par        also Dr Oneil for HTN\par        feels better since started gardening \par \par recent right hip replacement feels better can move more \par        9/16 had neck laminectomy, had infection and allergy to the suture material , has had multiple herniated disks\par        has been on thyroid supplement since her 20's , \par        Levothyroxine  137mcg labs reviewed nhightTFTs \par        labs reviewed good  \par        had thyroid US 1980's was fine, repeated 2011 small thyroid, no nodules\par        denies FHx thyroid cancer\par        had forehead radiation 1948 for a skin lesion\par        has on and off dysphagia\par        had EGD Dr Lynn \par        has no dysphonia\par        denies dyspnea\par        thyroid US 2011 small thyroid no nodules\par        labs reviewed high normal thyroid, finally low normal Vit D was low prior\par \par \par thyroid US 2011 MPRESSION: No focal nodules. Small heterogeneous thyroid gland similar to prior sonogram consistent with chronic thyroiditis.\par dexa scan 2011 normal \par has chronic fatigue sees also Cardiology

## 2023-01-08 NOTE — PHYSICAL EXAM
[Healthy Appearance] : healthy appearance [Obese] : obese [No Acute Distress] : no acute distress [Well Developed] : well developed [Normal Voice/Communication] : normal voice communication [Normal Sclera/Conjunctiva] : normal sclera/conjunctiva [Normal Outer Ear/Nose] : the ears and nose were normal in appearance [Normal Gait] : normal gait [Normal Affect] : the affect was normal [Normal Mood] : the mood was normal [de-identified] : loss of hearing  [de-identified] : left thyroid nodule? 1cm, mobile with swallowing  [de-identified] : knee replacement scars b/l  [de-identified] : small pinches, per sister does pick her skin a lot

## 2023-01-08 NOTE — ASSESSMENT
[0] : 2) Feeling down, depressed, or hopeless: Not at all (0) [PHQ-2 Negative - No further assessment needed] : PHQ-2 Negative - No further assessment needed [[id number]] : 0

## 2023-02-06 DIAGNOSIS — D72.829 ELEVATED WHITE BLOOD CELL COUNT, UNSPECIFIED: ICD-10-CM

## 2023-02-06 DIAGNOSIS — D69.6 THROMBOCYTOPENIA, UNSPECIFIED: ICD-10-CM

## 2023-02-13 ENCOUNTER — RESULT REVIEW (OUTPATIENT)
Age: 78
End: 2023-02-13

## 2023-02-13 ENCOUNTER — APPOINTMENT (OUTPATIENT)
Dept: HEMATOLOGY ONCOLOGY | Facility: CLINIC | Age: 78
End: 2023-02-13
Payer: MEDICARE

## 2023-02-13 VITALS
BODY MASS INDEX: 33.27 KG/M2 | HEART RATE: 56 BPM | HEIGHT: 61.5 IN | WEIGHT: 178.5 LBS | OXYGEN SATURATION: 97 % | SYSTOLIC BLOOD PRESSURE: 124 MMHG | RESPIRATION RATE: 16 BRPM | DIASTOLIC BLOOD PRESSURE: 64 MMHG | TEMPERATURE: 97.6 F

## 2023-02-13 DIAGNOSIS — R76.8 OTHER SPECIFIED ABNORMAL IMMUNOLOGICAL FINDINGS IN SERUM: ICD-10-CM

## 2023-02-13 DIAGNOSIS — G62.9 POLYNEUROPATHY, UNSPECIFIED: ICD-10-CM

## 2023-02-13 PROCEDURE — 36415 COLL VENOUS BLD VENIPUNCTURE: CPT

## 2023-02-13 PROCEDURE — 99213 OFFICE O/P EST LOW 20 MIN: CPT | Mod: 25

## 2023-02-13 NOTE — ASSESSMENT
[FreeTextEntry1] : 76 yo female referred by Dr. Jennifer Akins for evaluation of thrombocytopenia.  Labs were send for inflammatory parameters, nutritional deficiency, immunofixation, immunoglobulins. \par \par Patient has memory loss and doesn’t recall the details of her condition.\par She denies bleeding, fevers, night sweats, weight loss, recent infections or change in meds.\par I discussed with her  and she is drinking alcohol daily- both her and her spouse have memory impairment- sister comes to visit to provide hx- pt reports she has stopped drinking Ivette ( proxy)\par Patient has positive LUCAS in the past. \par \par # Breast biopsy- Flat epithelial atypia, Dr. Ramesh\par - continue surveillance.\par - mammogram due now \par \par # vit B12 borderline - start vit B12 \par \par #MELVINA- s/p IV iron, Ferritin 241\par \par # Thrombocytopenia - bone marrow biopsy, results from  reviewed - c/w ITP, d/w patient and family (  and sister). Reviewed presentation of thrombocytopenia, bleeding, petechiae.  Monitor q 3 months, treatment if platelets less then 30k.  Platelets 93k- no evidence of bleeding \par \par # Memory loss- here with  \par \par FH \par Father-  MI, colon cancer,  multiple polyps, \par Paternal brother ( Robbie) - colon cancer\par 3 paternal aunts - no cancer\par Paternal GF \par Paternal GM - colon cancer\par Genetic testing MSH6 and TERT  VUS - referred to genetic counselor- patient and sister refused to see genetic counselor \par \par Labs ordered, drawn office- reviewed, case and mgtm discussed with Dr. Low \par \par \par RTC 4 months \par

## 2023-02-13 NOTE — PHYSICAL EXAM
[Fully active, able to carry on all pre-disease performance without restriction] : Status 0 - Fully active, able to carry on all pre-disease performance without restriction [Normal] : affect appropriate [de-identified] : LE rash [de-identified] : forgetfull

## 2023-02-13 NOTE — HISTORY OF PRESENT ILLNESS
[de-identified] : 76 year old female presents today for initial consultation of thrombocytopenia, referred by Dr. Akins.  Labs dated 11/2/2021 show a platelet count of 48, other hematological parameters WNL.  She was just placed on Seroquel for agitation secondary to dementia.  Hx difficult to obtain  \par \par She denies any bleeding  [de-identified] : Patient presents today for follow up of thrombocytopenia. She states feeling 'good.

## 2023-02-18 PROBLEM — Z01.419 ENCOUNTER FOR GYNECOLOGICAL EXAMINATION: Status: ACTIVE | Noted: 2019-10-08

## 2023-02-18 PROBLEM — Z12.39 SCREENING FOR MALIGNANT NEOPLASM OF BREAST: Status: ACTIVE | Noted: 2021-09-24

## 2023-02-21 ENCOUNTER — APPOINTMENT (OUTPATIENT)
Dept: OBGYN | Facility: CLINIC | Age: 78
End: 2023-02-21
Payer: MEDICARE

## 2023-02-21 VITALS
HEIGHT: 61.5 IN | SYSTOLIC BLOOD PRESSURE: 130 MMHG | WEIGHT: 178 LBS | BODY MASS INDEX: 33.18 KG/M2 | DIASTOLIC BLOOD PRESSURE: 70 MMHG

## 2023-02-21 DIAGNOSIS — Z01.419 ENCOUNTER FOR GYNECOLOGICAL EXAMINATION (GENERAL) (ROUTINE) W/OUT ABNORMAL FINDINGS: ICD-10-CM

## 2023-02-21 DIAGNOSIS — Z12.39 ENCOUNTER FOR OTHER SCREENING FOR MALIGNANT NEOPLASM OF BREAST: ICD-10-CM

## 2023-02-21 PROCEDURE — G0101: CPT

## 2023-03-01 ENCOUNTER — APPOINTMENT (OUTPATIENT)
Dept: CARDIOLOGY | Facility: CLINIC | Age: 78
End: 2023-03-01
Payer: MEDICARE

## 2023-03-01 ENCOUNTER — NON-APPOINTMENT (OUTPATIENT)
Age: 78
End: 2023-03-01

## 2023-03-01 VITALS
DIASTOLIC BLOOD PRESSURE: 60 MMHG | RESPIRATION RATE: 18 BRPM | HEIGHT: 61 IN | TEMPERATURE: 97.7 F | OXYGEN SATURATION: 98 % | SYSTOLIC BLOOD PRESSURE: 114 MMHG | HEART RATE: 60 BPM | BODY MASS INDEX: 33.44 KG/M2 | WEIGHT: 177.13 LBS

## 2023-03-01 PROCEDURE — 99214 OFFICE O/P EST MOD 30 MIN: CPT

## 2023-03-01 PROCEDURE — 93000 ELECTROCARDIOGRAM COMPLETE: CPT

## 2023-03-01 RX ORDER — PANTOPRAZOLE 40 MG/1
40 TABLET, DELAYED RELEASE ORAL TWICE DAILY
Qty: 60 | Refills: 5 | Status: DISCONTINUED | COMMUNITY
Start: 2020-05-21 | End: 2023-03-01

## 2023-03-01 NOTE — DISCUSSION/SUMMARY
[FreeTextEntry1] : Patient's clinical condition remained stable there are no cardiovascular symptoms and her examination is satisfactory blood pressure is normal electrocardiogram is normal\par \par In reviewing the patient's lipid profiles it appeared that her LDL cholesterol was under excellent control until late in 2022 when the number increased quite dramatically.  This suggest to me that perhaps she had stopped taking rosuvastatin inadvertently.  Patient will be checking to make sure that she is still on rosuvastatin and if not she will be resuming this medication without delay.\par \par We will continue to follow the patient's progress on an annual basis she is also followed by her primary care physician.

## 2023-03-01 NOTE — PHYSICAL EXAM
[Well Developed] : well developed [Well Nourished] : well nourished [No Acute Distress] : no acute distress [Normal Conjunctiva] : normal conjunctiva [Normal Venous Pressure] : normal venous pressure [No Carotid Bruit] : no carotid bruit [Normal S1, S2] : normal S1, S2 [No Murmur] : no murmur [No Rub] : no rub [No Gallop] : no gallop [Clear Lung Fields] : clear lung fields [Good Air Entry] : good air entry [No Respiratory Distress] : no respiratory distress  [Soft] : abdomen soft [Non Tender] : non-tender [No Masses/organomegaly] : no masses/organomegaly [Normal Bowel Sounds] : normal bowel sounds [Normal Gait] : normal gait [No Edema] : no edema [No Cyanosis] : no cyanosis [No Clubbing] : no clubbing [No Varicosities] : no varicosities [No Rash] : no rash [No Skin Lesions] : no skin lesions [Moves all extremities] : moves all extremities [No Focal Deficits] : no focal deficits [Normal Speech] : normal speech [Alert and Oriented] : alert and oriented [Normal memory] : normal memory [de-identified] : Chronic venous stasis changes.  No calf or thigh tenderness.

## 2023-03-01 NOTE — REASON FOR VISIT
[FreeTextEntry1] : Patient has been followed in a preventive mode with principal risk factors of hypertension and hyperlipidemia\par \par She comes today for routine annual follow-up.  There have been no acute cardiac symptoms.  There have been no symptoms of chest pain shortness of breath dizziness lightheadedness or palpitations.  He is very active and does regular walking exercise\par \par As a result of years of working on her feet as well as 2 pregnancies there are chronic venous stasis changes involving mainly the lower extremities.

## 2023-05-05 ENCOUNTER — RX RENEWAL (OUTPATIENT)
Age: 78
End: 2023-05-05

## 2023-05-05 ENCOUNTER — APPOINTMENT (OUTPATIENT)
Dept: ENDOCRINOLOGY | Facility: CLINIC | Age: 78
End: 2023-05-05

## 2023-05-10 ENCOUNTER — RX RENEWAL (OUTPATIENT)
Age: 78
End: 2023-05-10

## 2023-05-15 ENCOUNTER — RESULT REVIEW (OUTPATIENT)
Age: 78
End: 2023-05-15

## 2023-05-16 ENCOUNTER — APPOINTMENT (OUTPATIENT)
Dept: HEMATOLOGY ONCOLOGY | Facility: CLINIC | Age: 78
End: 2023-05-16
Payer: MEDICARE

## 2023-05-16 ENCOUNTER — RX RENEWAL (OUTPATIENT)
Age: 78
End: 2023-05-16

## 2023-05-16 VITALS
TEMPERATURE: 96 F | BODY MASS INDEX: 34.8 KG/M2 | HEART RATE: 56 BPM | OXYGEN SATURATION: 98 % | DIASTOLIC BLOOD PRESSURE: 64 MMHG | RESPIRATION RATE: 17 BRPM | WEIGHT: 184.31 LBS | SYSTOLIC BLOOD PRESSURE: 129 MMHG | HEIGHT: 61 IN

## 2023-05-16 DIAGNOSIS — R41.89 OTHER SYMPTOMS AND SIGNS INVOLVING COGNITIVE FUNCTIONS AND AWARENESS: ICD-10-CM

## 2023-05-16 PROCEDURE — 99214 OFFICE O/P EST MOD 30 MIN: CPT | Mod: 25

## 2023-05-16 PROCEDURE — 36415 COLL VENOUS BLD VENIPUNCTURE: CPT

## 2023-05-16 NOTE — PHYSICAL EXAM
[Fully active, able to carry on all pre-disease performance without restriction] : Status 0 - Fully active, able to carry on all pre-disease performance without restriction [Normal] : affect appropriate [de-identified] : LE rash [de-identified] : forgetfull

## 2023-05-16 NOTE — HISTORY OF PRESENT ILLNESS
[de-identified] : 76 year old female presents today for initial consultation of thrombocytopenia, referred by Dr. Akins.  Labs dated 11/2/2021 show a platelet count of 48, other hematological parameters WNL.  She was just placed on Seroquel for agitation secondary to dementia.  Hx difficult to obtain  \par \par She denies any bleeding  [de-identified] : Patient presents today for follow up of thrombocytopenia. She states feeling 'good.  Patient is accompanied by her

## 2023-06-25 ENCOUNTER — NON-APPOINTMENT (OUTPATIENT)
Age: 78
End: 2023-06-25

## 2023-06-26 ENCOUNTER — APPOINTMENT (OUTPATIENT)
Dept: FAMILY MEDICINE | Facility: CLINIC | Age: 78
End: 2023-06-26
Payer: MEDICARE

## 2023-06-26 VITALS
HEIGHT: 61 IN | RESPIRATION RATE: 17 BRPM | DIASTOLIC BLOOD PRESSURE: 59 MMHG | SYSTOLIC BLOOD PRESSURE: 124 MMHG | HEART RATE: 52 BPM | WEIGHT: 178 LBS | OXYGEN SATURATION: 99 % | TEMPERATURE: 98.6 F | BODY MASS INDEX: 33.61 KG/M2

## 2023-06-26 DIAGNOSIS — I25.10 ATHEROSCLEROTIC HEART DISEASE OF NATIVE CORONARY ARTERY W/OUT ANGINA PECTORIS: ICD-10-CM

## 2023-06-26 DIAGNOSIS — R92.0 MAMMOGRAPHIC MICROCALCIFICATION FOUND ON DIAGNOSTIC IMAGING OF BREAST: ICD-10-CM

## 2023-06-26 DIAGNOSIS — Z00.00 ENCOUNTER FOR GENERAL ADULT MEDICAL EXAMINATION W/OUT ABNORMAL FINDINGS: ICD-10-CM

## 2023-06-26 PROCEDURE — 36415 COLL VENOUS BLD VENIPUNCTURE: CPT

## 2023-06-26 PROCEDURE — 99214 OFFICE O/P EST MOD 30 MIN: CPT | Mod: 25

## 2023-06-26 PROCEDURE — G0439: CPT

## 2023-06-26 NOTE — PLAN
[FreeTextEntry1] : Continue Neurology care for dementia\par Chronic medical problems are stable\par Specialist notes reviewed\par Blood collected in the office

## 2023-06-26 NOTE — HEALTH RISK ASSESSMENT
[Good] : ~his/her~  mood as  good [0] : 2) Feeling down, depressed, or hopeless: Not at all (0) [Patient reported mammogram was normal] : Patient reported mammogram was normal [Patient reported PAP Smear was normal] : Patient reported PAP Smear was normal [Patient reported bone density results were abnormal] : Patient reported bone density results were abnormal [Patient reported colonoscopy was normal] : Patient reported colonoscopy was normal [HIV test declined] : HIV test declined [Hepatitis C test declined] : Hepatitis C test declined [Change in mental status noted] : Change in mental status noted [None] : None [With Significant Other] : lives with significant other [# of Members in Household ___] :  household currently consist of [unfilled] member(s) [] :  [Feels Safe at Home] : Feels safe at home [Fully functional (bathing, dressing, toileting, transferring, walking, feeding)] : Fully functional (bathing, dressing, toileting, transferring, walking, feeding) [Fully functional (using the telephone, shopping, preparing meals, housekeeping, doing laundry, using] : Fully functional and needs no help or supervision to perform IADLs (using the telephone, shopping, preparing meals, housekeeping, doing laundry, using transportation, managing medications and managing finances) [Reports normal functional visual acuity (ie: able to read med bottle)] : Reports normal functional visual acuity [Smoke Detector] : smoke detector [Carbon Monoxide Detector] : carbon monoxide detector [Safety elements used in home] : safety elements used in home [Seat Belt] :  uses seat belt [Sunscreen] : uses sunscreen [Former] : Former [> 15 Years] : > 15 Years [de-identified] : None [Language] : denies difficulty with language [Behavior] : denies difficulty with behavior [Learning/Retaining New Information] : denies difficulty learning/retaining new information [Handling Complex Tasks] : denies difficulty handling complex tasks [Reasoning] : denies difficulty with reasoning [Spatial Ability and Orientation] : denies difficulty with spatial ability and orientation [Reports changes in hearing] : Reports no changes in hearing [Reports changes in vision] : Reports no changes in vision [Reports changes in dental health] : Reports no changes in dental health [Guns at Home] : no guns at home [Travel to Developing Areas] : does not  travel to developing areas [TB Exposure] : is not being exposed to tuberculosis [Caregiver Concerns] : does not have caregiver concerns [MammogramDate] : 11/22 [PapSmearDate] : 02/23 [BoneDensityDate] : 01/23 [ColonoscopyDate] : 01/18 [AdvancecareDate] : 06/23

## 2023-06-26 NOTE — HISTORY OF PRESENT ILLNESS
[FreeTextEntry1] : Annual exam\par F/U chronic medical problems [de-identified] : Reports decline in mental status since last seen\par ER visit recently for skin rash possible Lyme, need to be tested\par Endocrinology and cardio notes reviewed\par

## 2023-06-26 NOTE — REVIEW OF SYSTEMS
[Joint Pain] : joint pain [Joint Stiffness] : joint stiffness [Confusion] : confusion [Memory Loss] : memory loss [Negative] : Heme/Lymph

## 2023-06-26 NOTE — PHYSICAL EXAM
[Normal] : soft, non-tender, non-distended, no masses palpated, no HSM and normal bowel sounds [de-identified] : demented

## 2023-06-27 LAB
25(OH)D3 SERPL-MCNC: 89.2 NG/ML
ALBUMIN SERPL ELPH-MCNC: 4.1 G/DL
ALP BLD-CCNC: 88 U/L
ALT SERPL-CCNC: 8 U/L
ANION GAP SERPL CALC-SCNC: 10 MMOL/L
AST SERPL-CCNC: 16 U/L
BILIRUB SERPL-MCNC: 0.5 MG/DL
BUN SERPL-MCNC: 20 MG/DL
CALCIUM SERPL-MCNC: 10.1 MG/DL
CHLORIDE SERPL-SCNC: 106 MMOL/L
CHOLEST SERPL-MCNC: 165 MG/DL
CO2 SERPL-SCNC: 25 MMOL/L
CREAT SERPL-MCNC: 1.02 MG/DL
EGFR: 56 ML/MIN/1.73M2
GLUCOSE SERPL-MCNC: 96 MG/DL
HDLC SERPL-MCNC: 63 MG/DL
IRON SATN MFR SERPL: 32 %
IRON SERPL-MCNC: 74 UG/DL
LDLC SERPL CALC-MCNC: 85 MG/DL
NONHDLC SERPL-MCNC: 102 MG/DL
POTASSIUM SERPL-SCNC: 4.2 MMOL/L
PROT SERPL-MCNC: 6.1 G/DL
SODIUM SERPL-SCNC: 141 MMOL/L
TIBC SERPL-MCNC: 230 UG/DL
TRIGL SERPL-MCNC: 87 MG/DL
TSH SERPL-ACNC: 0.49 UIU/ML
UIBC SERPL-MCNC: 157 UG/DL
VIT B12 SERPL-MCNC: 971 PG/ML

## 2023-07-28 ENCOUNTER — APPOINTMENT (OUTPATIENT)
Dept: ENDOCRINOLOGY | Facility: CLINIC | Age: 78
End: 2023-07-28
Payer: MEDICARE

## 2023-07-28 VITALS
BODY MASS INDEX: 34.95 KG/M2 | DIASTOLIC BLOOD PRESSURE: 60 MMHG | OXYGEN SATURATION: 96 % | SYSTOLIC BLOOD PRESSURE: 100 MMHG | WEIGHT: 178 LBS | HEIGHT: 60 IN | HEART RATE: 72 BPM

## 2023-07-28 DIAGNOSIS — E01.0 IODINE-DEFICIENCY RELATED DIFFUSE (ENDEMIC) GOITER: ICD-10-CM

## 2023-07-28 LAB
25(OH)D3 SERPL-MCNC: 85.3 NG/ML
ALBUMIN SERPL ELPH-MCNC: 4 G/DL
ALP BLD-CCNC: 102 U/L
ALT SERPL-CCNC: 10 U/L
ANION GAP SERPL CALC-SCNC: 12 MMOL/L
AST SERPL-CCNC: 16 U/L
BILIRUB SERPL-MCNC: 0.4 MG/DL
BUN SERPL-MCNC: 17 MG/DL
CALCIUM SERPL-MCNC: 9.4 MG/DL
CALCIUM SERPL-MCNC: 9.4 MG/DL
CHLORIDE SERPL-SCNC: 107 MMOL/L
CHOLEST SERPL-MCNC: 174 MG/DL
CO2 SERPL-SCNC: 23 MMOL/L
COLLAGEN CTX SERPL-MCNC: 314 PG/ML
CREAT SERPL-MCNC: 0.97 MG/DL
EGFR: 59 ML/MIN/1.73M2
ESTIMATED AVERAGE GLUCOSE: 105 MG/DL
FOLATE SERPL-MCNC: 6.6 NG/ML
GLUCOSE SERPL-MCNC: 100 MG/DL
HBA1C MFR BLD HPLC: 5.3 %
HDLC SERPL-MCNC: 65 MG/DL
LDLC SERPL CALC-MCNC: 93 MG/DL
MAGNESIUM SERPL-MCNC: 2 MG/DL
NONHDLC SERPL-MCNC: 108 MG/DL
PARATHYROID HORMONE INTACT: 50 PG/ML
PHOSPHATE SERPL-MCNC: 3 MG/DL
POTASSIUM SERPL-SCNC: 4.4 MMOL/L
PROT SERPL-MCNC: 6.3 G/DL
SODIUM SERPL-SCNC: 142 MMOL/L
TRIGL SERPL-MCNC: 81 MG/DL
TSH SERPL-ACNC: 0.08 UIU/ML
VIT B12 SERPL-MCNC: 657 PG/ML

## 2023-07-28 PROCEDURE — 99215 OFFICE O/P EST HI 40 MIN: CPT | Mod: 25

## 2023-07-28 PROCEDURE — G0447 BEHAVIOR COUNSEL OBESITY 15M: CPT | Mod: 59

## 2023-07-28 PROCEDURE — G0444 DEPRESSION SCREEN ANNUAL: CPT | Mod: 59

## 2023-07-28 RX ORDER — FLUTICASONE PROPIONATE AND SALMETEROL 50; 250 UG/1; UG/1
250-50 POWDER RESPIRATORY (INHALATION)
Refills: 0 | Status: DISCONTINUED | COMMUNITY
End: 2023-07-28

## 2023-07-31 NOTE — ASSESSMENT
[0] : 1) Little interest or pleasure doing things: Not at all (0) [1] : 2) Feeling down, depressed, or hopeless for several days (1) [I have developed a follow-up plan documented below in the note.] : I have developed a follow-up plan documented below in the note.

## 2023-07-31 NOTE — HISTORY OF PRESENT ILLNESS
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 today she came with her sister  due to her memory problems  also treated by  Dr Gail Thornton for demnetia         hyperlipidemia, stopped Pravastatin as she had memory problems with it, still has it with Rosuvatstatin 5mg qhs memory problems are the same         also Dr Oneil for HTN        feels better since started gardening   recent right hip replacement feels better can move more         9/16 had neck laminectomy, had infection and allergy to the suture material , has had multiple herniated disks        has been on thyroid supplement since her 20's ,         Levothyroxine  137mcg labs reviewed nhightTFTs         labs reviewed good          had thyroid US 1980's was fine, repeated 2011 small thyroid, no nodules        denies FHx thyroid cancer        had forehead radiation 1948 for a skin lesion        has on and off dysphagia        had EGD Dr Lynn         has no dysphonia        denies dyspnea        thyroid US 2011 small thyroid no nodules        labs reviewed high normal thyroid, finally low normal Vit D was low prior   thyroid US 2011 MPRESSION: No focal nodules. Small heterogeneous thyroid gland similar to prior sonogram consistent with chronic thyroiditis. dexa scan 2011 normal  has chronic fatigue sees also Cardiology

## 2023-07-31 NOTE — REVIEW OF SYSTEMS
[Recent Weight Loss (___ Lbs)] : recent weight loss: [unfilled] lbs [Dry Skin] : dry skin [FreeTextEntry2] : intentional  [de-identified] : itchy, seen Dermatology per

## 2023-10-05 ENCOUNTER — APPOINTMENT (OUTPATIENT)
Dept: RHEUMATOLOGY | Facility: CLINIC | Age: 78
End: 2023-10-05

## 2023-10-26 ENCOUNTER — APPOINTMENT (OUTPATIENT)
Dept: FAMILY MEDICINE | Facility: CLINIC | Age: 78
End: 2023-10-26
Payer: MEDICARE

## 2023-10-26 VITALS
HEIGHT: 60 IN | WEIGHT: 169 LBS | BODY MASS INDEX: 33.18 KG/M2 | DIASTOLIC BLOOD PRESSURE: 56 MMHG | HEART RATE: 51 BPM | TEMPERATURE: 98.9 F | OXYGEN SATURATION: 98 % | SYSTOLIC BLOOD PRESSURE: 100 MMHG

## 2023-10-26 DIAGNOSIS — Z23 ENCOUNTER FOR IMMUNIZATION: ICD-10-CM

## 2023-10-26 PROCEDURE — G0008: CPT

## 2023-10-26 PROCEDURE — 90662 IIV NO PRSV INCREASED AG IM: CPT

## 2023-10-26 PROCEDURE — 99212 OFFICE O/P EST SF 10 MIN: CPT | Mod: 25

## 2023-11-08 ENCOUNTER — RX RENEWAL (OUTPATIENT)
Age: 78
End: 2023-11-08

## 2023-11-08 LAB
25(OH)D3 SERPL-MCNC: 75.2 NG/ML
ALBUMIN SERPL ELPH-MCNC: 3.9 G/DL
ALP BLD-CCNC: 102 U/L
ALT SERPL-CCNC: 9 U/L
ANION GAP SERPL CALC-SCNC: 9 MMOL/L
AST SERPL-CCNC: 12 U/L
BILIRUB SERPL-MCNC: 0.4 MG/DL
BUN SERPL-MCNC: 20 MG/DL
CALCIUM SERPL-MCNC: 9.7 MG/DL
CALCIUM SERPL-MCNC: 9.7 MG/DL
CHLORIDE SERPL-SCNC: 105 MMOL/L
CHOLEST SERPL-MCNC: 185 MG/DL
CO2 SERPL-SCNC: 26 MMOL/L
COLLAGEN CTX SERPL-MCNC: 245 PG/ML
CREAT SERPL-MCNC: 0.92 MG/DL
EGFR: 63 ML/MIN/1.73M2
ESTIMATED AVERAGE GLUCOSE: 103 MG/DL
FOLATE SERPL-MCNC: 5.1 NG/ML
GLUCOSE SERPL-MCNC: 93 MG/DL
HBA1C MFR BLD HPLC: 5.2 %
HDLC SERPL-MCNC: 51 MG/DL
LDLC SERPL CALC-MCNC: 116 MG/DL
MAGNESIUM SERPL-MCNC: 2 MG/DL
NONHDLC SERPL-MCNC: 134 MG/DL
PARATHYROID HORMONE INTACT: 44 PG/ML
PHOSPHATE SERPL-MCNC: 2.9 MG/DL
POTASSIUM SERPL-SCNC: 4 MMOL/L
PROT SERPL-MCNC: 6.1 G/DL
SODIUM SERPL-SCNC: 141 MMOL/L
TRIGL SERPL-MCNC: 99 MG/DL
TSH SERPL-ACNC: 0.01 UIU/ML
VIT B12 SERPL-MCNC: 1049 PG/ML

## 2023-11-09 ENCOUNTER — APPOINTMENT (OUTPATIENT)
Dept: ENDOCRINOLOGY | Facility: CLINIC | Age: 78
End: 2023-11-09
Payer: MEDICARE

## 2023-11-09 VITALS
OXYGEN SATURATION: 97 % | HEART RATE: 65 BPM | BODY MASS INDEX: 31.56 KG/M2 | SYSTOLIC BLOOD PRESSURE: 100 MMHG | DIASTOLIC BLOOD PRESSURE: 62 MMHG | WEIGHT: 165 LBS | HEIGHT: 60.5 IN

## 2023-11-09 DIAGNOSIS — E78.5 HYPERLIPIDEMIA, UNSPECIFIED: ICD-10-CM

## 2023-11-09 PROCEDURE — G0447 BEHAVIOR COUNSEL OBESITY 15M: CPT | Mod: 59

## 2023-11-09 PROCEDURE — 99215 OFFICE O/P EST HI 40 MIN: CPT | Mod: 25

## 2023-11-13 ENCOUNTER — RESULT REVIEW (OUTPATIENT)
Age: 78
End: 2023-11-13

## 2023-11-13 ENCOUNTER — APPOINTMENT (OUTPATIENT)
Dept: BREAST CENTER | Facility: CLINIC | Age: 78
End: 2023-11-13
Payer: MEDICARE

## 2023-11-13 VITALS — BODY MASS INDEX: 34.04 KG/M2 | WEIGHT: 185 LBS | HEIGHT: 62 IN

## 2023-11-13 DIAGNOSIS — Z80.3 FAMILY HISTORY OF MALIGNANT NEOPLASM OF BREAST: ICD-10-CM

## 2023-11-13 DIAGNOSIS — N60.81 OTHER BENIGN MAMMARY DYSPLASIAS OF RIGHT BREAST: ICD-10-CM

## 2023-11-13 PROCEDURE — 99213 OFFICE O/P EST LOW 20 MIN: CPT

## 2023-11-14 ENCOUNTER — APPOINTMENT (OUTPATIENT)
Dept: HEMATOLOGY ONCOLOGY | Facility: CLINIC | Age: 78
End: 2023-11-14

## 2023-11-21 ENCOUNTER — RX RENEWAL (OUTPATIENT)
Age: 78
End: 2023-11-21

## 2023-11-28 ENCOUNTER — RX RENEWAL (OUTPATIENT)
Age: 78
End: 2023-11-28

## 2023-12-07 ENCOUNTER — RESULT REVIEW (OUTPATIENT)
Age: 78
End: 2023-12-07

## 2023-12-07 ENCOUNTER — APPOINTMENT (OUTPATIENT)
Dept: HEMATOLOGY ONCOLOGY | Facility: CLINIC | Age: 78
End: 2023-12-07
Payer: MEDICARE

## 2023-12-07 VITALS
BODY MASS INDEX: 31.74 KG/M2 | OXYGEN SATURATION: 98 % | TEMPERATURE: 98 F | WEIGHT: 172.5 LBS | HEART RATE: 63 BPM | SYSTOLIC BLOOD PRESSURE: 146 MMHG | DIASTOLIC BLOOD PRESSURE: 73 MMHG | HEIGHT: 62 IN | RESPIRATION RATE: 16 BRPM

## 2023-12-07 DIAGNOSIS — M06.4 INFLAMMATORY POLYARTHROPATHY: ICD-10-CM

## 2023-12-07 DIAGNOSIS — F03.911 UNSPECIFIED DEMENTIA, UNSPECIFIED SEVERITY, WITH AGITATION: ICD-10-CM

## 2023-12-07 DIAGNOSIS — D69.3 IMMUNE THROMBOCYTOPENIC PURPURA: ICD-10-CM

## 2023-12-07 PROCEDURE — 36415 COLL VENOUS BLD VENIPUNCTURE: CPT

## 2023-12-07 PROCEDURE — 99213 OFFICE O/P EST LOW 20 MIN: CPT

## 2023-12-08 PROBLEM — F03.911 AGITATION DUE TO DEMENTIA: Status: ACTIVE | Noted: 2021-11-09

## 2023-12-08 PROBLEM — M06.4 INFLAMMATORY POLYARTHROPATHY: Status: ACTIVE | Noted: 2018-12-14

## 2023-12-12 ENCOUNTER — RX RENEWAL (OUTPATIENT)
Age: 78
End: 2023-12-12

## 2023-12-12 RX ORDER — OLMESARTAN MEDOXOMIL 40 MG/1
40 TABLET, FILM COATED ORAL
Qty: 90 | Refills: 3 | Status: ACTIVE | COMMUNITY
Start: 2020-07-13 | End: 1900-01-01

## 2024-01-26 ENCOUNTER — APPOINTMENT (OUTPATIENT)
Dept: PEDIATRIC ORTHOPEDIC SURGERY | Facility: CLINIC | Age: 79
End: 2024-01-26
Payer: MEDICARE

## 2024-01-26 VITALS
HEIGHT: 62 IN | WEIGHT: 172 LBS | DIASTOLIC BLOOD PRESSURE: 70 MMHG | TEMPERATURE: 96.6 F | BODY MASS INDEX: 31.65 KG/M2 | SYSTOLIC BLOOD PRESSURE: 109 MMHG

## 2024-01-26 DIAGNOSIS — M75.112 INCOMPLETE ROTATOR CUFF TEAR OR RUPTURE OF LEFT SHOULDER, NOT SPECIFIED AS TRAUMATIC: ICD-10-CM

## 2024-01-26 PROCEDURE — 99203 OFFICE O/P NEW LOW 30 MIN: CPT | Mod: 25

## 2024-01-26 PROCEDURE — 73030 X-RAY EXAM OF SHOULDER: CPT | Mod: 26

## 2024-01-26 PROCEDURE — 20610 DRAIN/INJ JOINT/BURSA W/O US: CPT | Mod: LT

## 2024-01-26 NOTE — PHYSICAL EXAM
[de-identified] : Exam today reveals good motion of the cervical spine without discomfort the left shoulder has an obvious arc of impingement with mild restriction of full forward flexion abduction in scapular plane as well has mildly restricted painful external rotation.  She has discomfort over the greater tuberosity and subacromial space mild weakness to the shoulder girdle is noted at least on the basis of pain.  Neurovascular status is intact.  Review of x-rays left shoulder January 21, 2024 [facility] degenerative changes only no fractures/lesion

## 2024-01-26 NOTE — HISTORY OF PRESENT ILLNESS
[de-identified] : This 79-year-old who suffers from dementia is seen today with a 10-day history of pain and stiffness to the left shoulder there is no obvious history of traumatic or precipitating event.  Because of significant pain she presented to [facility] earlier this week where x-rays were taken and she was sent home.  Her pain is mainly raising the arm above head.  Prior to this she had been doing well.  Past history aside from dementia includes hypothyroidism

## 2024-01-26 NOTE — ASSESSMENT
[FreeTextEntry1] : Impression: Strain left rotator cuff.  I have injected the subacromial space with methylprednisolone and lidocaine without incident.  She will continue with Tylenol home exercise program return on a as needed basis

## 2024-02-12 ENCOUNTER — APPOINTMENT (OUTPATIENT)
Dept: NEUROLOGY | Facility: CLINIC | Age: 79
End: 2024-02-12
Payer: MEDICARE

## 2024-02-12 ENCOUNTER — NON-APPOINTMENT (OUTPATIENT)
Age: 79
End: 2024-02-12

## 2024-02-12 VITALS
BODY MASS INDEX: 31.65 KG/M2 | SYSTOLIC BLOOD PRESSURE: 115 MMHG | OXYGEN SATURATION: 98 % | WEIGHT: 172 LBS | DIASTOLIC BLOOD PRESSURE: 67 MMHG | HEART RATE: 64 BPM | HEIGHT: 62 IN

## 2024-02-12 DIAGNOSIS — Z82.49 FAMILY HISTORY OF ISCHEMIC HEART DISEASE AND OTHER DISEASES OF THE CIRCULATORY SYSTEM: ICD-10-CM

## 2024-02-12 DIAGNOSIS — R41.3 OTHER AMNESIA: ICD-10-CM

## 2024-02-12 PROCEDURE — 99204 OFFICE O/P NEW MOD 45 MIN: CPT

## 2024-02-12 RX ORDER — TRIAMCINOLONE ACETONIDE 1 MG/G
0.1 OINTMENT TOPICAL
Qty: 80 | Refills: 0 | Status: DISCONTINUED | COMMUNITY
Start: 2021-03-25 | End: 2024-02-12

## 2024-02-12 RX ORDER — TACROLIMUS 1 MG/G
0.1 OINTMENT TOPICAL
Qty: 100 | Refills: 0 | Status: DISCONTINUED | COMMUNITY
Start: 2022-01-05 | End: 2024-02-12

## 2024-02-12 RX ORDER — MEMANTINE HYDROCHLORIDE 28 MG/1
28 CAPSULE, EXTENDED RELEASE ORAL
Refills: 0 | Status: DISCONTINUED | COMMUNITY
End: 2024-02-12

## 2024-02-12 RX ORDER — LEVOTHYROXINE SODIUM 112 UG/1
112 TABLET ORAL
Qty: 90 | Refills: 1 | Status: DISCONTINUED | COMMUNITY
Start: 2021-11-19 | End: 2024-02-12

## 2024-02-12 RX ORDER — ERGOCALCIFEROL 1.25 MG/1
1.25 MG CAPSULE, LIQUID FILLED ORAL
Qty: 24 | Refills: 1 | Status: DISCONTINUED | COMMUNITY
Start: 2023-05-16 | End: 2024-02-12

## 2024-02-12 RX ORDER — NYSTATIN 100000 U/G
100000 OINTMENT TOPICAL
Qty: 30 | Refills: 0 | Status: DISCONTINUED | COMMUNITY
Start: 2021-06-15 | End: 2024-02-12

## 2024-02-13 PROBLEM — Z82.49 FAMILY HISTORY OF PULMONARY EMBOLISM: Status: ACTIVE | Noted: 2024-02-13

## 2024-02-14 NOTE — DISCUSSION/SUMMARY
[FreeTextEntry1] : Laisha is a 79-year-old RH woman with a history of hypothyroidism, depression/anxiety, htn, possible ITP presenting with memory difficulties. History is very vague. Reported history of Alzheimers but it appears to be getting worse. Not on medication. Discussed memory strategies, healthy diet, exercise.

## 2024-02-14 NOTE — HISTORY OF PRESENT ILLNESS
[FreeTextEntry1] : Laisha is a 79-year-old RH woman with a history of hypothyroidism, depression/anxiety, hypertension and possible idiopathic thrombocytopenic purpura. History provided by herself and her  are very limited. He states that she has had worsening memory loss for the past three years or so.  She previously saw Dr. Gail Thornton. She was diagnosed with Alzheimers and may have also had testing completed with Dr. Woodruff at [place].  states that she forgets things instantaneously and repeats things often. She is not paying attention or in the moment. This is noticeable to their children and to her  sister. She is not getting lost when driving. Left stove on once.   Medications synthroid 112 mcg daily lexapro olmesartan 40 mg  rosuvastatin 5 mg daily vitamin d 78846 units MW   Medical Condition hypothyroidism depression/anxiety hypertension high cholesterol  Surgeries as below  Social history Drives  Lives in [place] Retired - , [email or web address].   Remote smoker, hasn't smoked in years no alcohol no drug use   Family History: Mother -  - thyroid  Father -  - 86 - htn siblings - pulmonary embolism

## 2024-02-14 NOTE — ASSESSMENT
[FreeTextEntry1] : Please get neuropsychological testing Please get MRI brain  Please see an audiologist Return to clinic in 3-4 months to see Dr. Devi Andrade  For brain health   - healthy eating/diet for memory cognition with diet rich in fiber, fruits and vegetables and low in saturated fats, processed foods.  - good sleep, 7-8 hours a night. No use of phone or watching television before bed.  - aerobic exercise, at least 30 minutes, such as a brisk walk 3-4 times a week.  - keep mind active with puzzles, reading , socializing with others.  - abstaining from excess alcohol, drug use.  - blood pressure and cholesterol monitoring , blood glucose monitoring to prevent microvascular disease which can lead to cognitive impairment.

## 2024-02-14 NOTE — PHYSICAL EXAM
[FreeTextEntry1] : Mental Status: knows month, off on the day, knows the year. Knows name of hospital. $1.50 = 6. Able to do serial sevens. Able to spell "world" backwards. 0/3 recall of three words, 2/3 with cues  Language/Speech : speech fluent, can repeat, can name knuckle, wrist Cranial Nerves  II: Pupils equal and reactive,  VFF full III, IV, VI: EOMI V : normal sensation in V1-V3 b/l VII : no asymmetry, no nasolabial fold flattening VIII: normal hearing to voice  IX, X: normal palatal elevation, uvula midline XI: 5/5 head turn and 5/5 shoulder shrug bilaterally XII : midline tongue protrusion Motor: no drift in limbs, normal bulk and tone, 5/5 in all extremities Sensory: normal to light touch, diminished vibratory sensation distally Reflexes: brachioradialis, biceps, triceps 2+ b/l, patella  left 3+, right 2+ and ankles 1+ bilaterally Toes are down-going  Coordination: Normal finger to nose Gait: decreased right arm swing   
none

## 2024-02-18 ENCOUNTER — RESULT REVIEW (OUTPATIENT)
Age: 79
End: 2024-02-18

## 2024-02-26 ENCOUNTER — APPOINTMENT (OUTPATIENT)
Dept: OBGYN | Facility: CLINIC | Age: 79
End: 2024-02-26

## 2024-02-27 ENCOUNTER — APPOINTMENT (OUTPATIENT)
Dept: ENDOCRINOLOGY | Facility: CLINIC | Age: 79
End: 2024-02-27
Payer: COMMERCIAL

## 2024-02-27 VITALS
SYSTOLIC BLOOD PRESSURE: 132 MMHG | WEIGHT: 181 LBS | HEART RATE: 76 BPM | OXYGEN SATURATION: 98 % | DIASTOLIC BLOOD PRESSURE: 66 MMHG | HEIGHT: 60.75 IN | BODY MASS INDEX: 34.62 KG/M2

## 2024-02-27 DIAGNOSIS — E66.01 MORBID (SEVERE) OBESITY DUE TO EXCESS CALORIES: ICD-10-CM

## 2024-02-27 DIAGNOSIS — E53.8 DEFICIENCY OF OTHER SPECIFIED B GROUP VITAMINS: ICD-10-CM

## 2024-02-27 DIAGNOSIS — E66.9 OBESITY, UNSPECIFIED: ICD-10-CM

## 2024-02-27 LAB
24R-OH-CALCIDIOL SERPL-MCNC: 79.8 PG/ML
25(OH)D3 SERPL-MCNC: 90.3 NG/ML
ALBUMIN SERPL ELPH-MCNC: 4.1 G/DL
ALP BLD-CCNC: 117 U/L
ALT SERPL-CCNC: 14 U/L
ANION GAP SERPL CALC-SCNC: 13 MMOL/L
AST SERPL-CCNC: 17 U/L
BILIRUB SERPL-MCNC: 0.2 MG/DL
BUN SERPL-MCNC: 16 MG/DL
CALCIUM SERPL-MCNC: 9.7 MG/DL
CALCIUM SERPL-MCNC: 9.7 MG/DL
CHLORIDE SERPL-SCNC: 106 MMOL/L
CO2 SERPL-SCNC: 24 MMOL/L
COLLAGEN CTX SERPL-MCNC: 112 PG/ML
CREAT SERPL-MCNC: 0.93 MG/DL
EGFR: 62 ML/MIN/1.73M2
GLUCOSE SERPL-MCNC: 106 MG/DL
MAGNESIUM SERPL-MCNC: 1.9 MG/DL
PARATHYROID HORMONE INTACT: 55 PG/ML
POTASSIUM SERPL-SCNC: 4.5 MMOL/L
PROT SERPL-MCNC: 6.3 G/DL
SODIUM SERPL-SCNC: 144 MMOL/L
T4 FREE SERPL-MCNC: 1.3 NG/DL
TSH SERPL-ACNC: 1.44 UIU/ML

## 2024-02-27 PROCEDURE — 99215 OFFICE O/P EST HI 40 MIN: CPT

## 2024-02-27 PROCEDURE — G0447 BEHAVIOR COUNSEL OBESITY 15M: CPT | Mod: 59

## 2024-02-27 RX ORDER — ERGOCALCIFEROL 1.25 MG/1
1.25 MG CAPSULE, LIQUID FILLED ORAL
Qty: 24 | Refills: 1 | Status: DISCONTINUED | COMMUNITY
Start: 2019-12-09 | End: 2024-02-27

## 2024-02-27 NOTE — PHYSICAL EXAM
[Alert] : alert [Well Nourished] : well nourished [No Acute Distress] : no acute distress [Well Developed] : well developed [Normal Sclera/Conjunctiva] : normal sclera/conjunctiva [EOMI] : extra ocular movement intact [No Proptosis] : no proptosis [Normal Oropharynx] : the oropharynx was normal [Thyroid Not Enlarged] : the thyroid was not enlarged [No Thyroid Nodules] : no palpable thyroid nodules [No Respiratory Distress] : no respiratory distress [No Accessory Muscle Use] : no accessory muscle use [Clear to Auscultation] : lungs were clear to auscultation bilaterally [Normal S1, S2] : normal S1 and S2 [Normal Rate] : heart rate was normal [Regular Rhythm] : with a regular rhythm [No Edema] : no peripheral edema [Pedal Pulses Normal] : the pedal pulses are present [Normal Bowel Sounds] : normal bowel sounds [Not Tender] : non-tender [Soft] : abdomen soft [Not Distended] : not distended [Normal Anterior Cervical Nodes] : no anterior cervical lymphadenopathy [Normal Posterior Cervical Nodes] : no posterior cervical lymphadenopathy [No Spinal Tenderness] : no spinal tenderness [Spine Straight] : spine straight [No Stigmata of Cushings Syndrome] : no stigmata of Cushings Syndrome [Normal Strength/Tone] : muscle strength and tone were normal [Normal Gait] : normal gait [No Rash] : no rash [Normal Reflexes] : deep tendon reflexes were 2+ and symmetric [No Tremors] : no tremors [Oriented x3] : oriented to person, place, and time

## 2024-03-05 ENCOUNTER — APPOINTMENT (OUTPATIENT)
Dept: CARDIOLOGY | Facility: CLINIC | Age: 79
End: 2024-03-05

## 2024-03-28 ENCOUNTER — APPOINTMENT (OUTPATIENT)
Dept: HEART AND VASCULAR | Facility: CLINIC | Age: 79
End: 2024-03-28
Payer: MEDICARE

## 2024-03-28 ENCOUNTER — NON-APPOINTMENT (OUTPATIENT)
Age: 79
End: 2024-03-28

## 2024-03-28 VITALS
DIASTOLIC BLOOD PRESSURE: 76 MMHG | HEIGHT: 60 IN | SYSTOLIC BLOOD PRESSURE: 108 MMHG | RESPIRATION RATE: 18 BRPM | BODY MASS INDEX: 35.41 KG/M2 | HEART RATE: 74 BPM | OXYGEN SATURATION: 98 % | WEIGHT: 180.38 LBS | TEMPERATURE: 97.9 F

## 2024-03-28 DIAGNOSIS — E78.49 OTHER HYPERLIPIDEMIA: ICD-10-CM

## 2024-03-28 DIAGNOSIS — R01.1 CARDIAC MURMUR, UNSPECIFIED: ICD-10-CM

## 2024-03-28 DIAGNOSIS — I10 ESSENTIAL (PRIMARY) HYPERTENSION: ICD-10-CM

## 2024-03-28 PROCEDURE — 93000 ELECTROCARDIOGRAM COMPLETE: CPT

## 2024-03-28 PROCEDURE — 99214 OFFICE O/P EST MOD 30 MIN: CPT

## 2024-03-28 NOTE — HISTORY OF PRESENT ILLNESS
[FreeTextEntry1] : 80 y/o F patient of Dr. Oneil here today to transition care. Followed by Dr Oneil.  Hx HTN HLD dementia, chronic venous stasis..  No complaints.  No cp/sob/palps/edema.   Per  patient with dementia, being evaluated by neurology.    Former smoker

## 2024-03-28 NOTE — PHYSICAL EXAM
[Well Developed] : well developed [Well Nourished] : well nourished [No Acute Distress] : no acute distress [Normal Conjunctiva] : normal conjunctiva [Normal Venous Pressure] : normal venous pressure [No Carotid Bruit] : no carotid bruit [Normal S1, S2] : normal S1, S2 [No Rub] : no rub [No Gallop] : no gallop [Clear Lung Fields] : clear lung fields [Good Air Entry] : good air entry [No Respiratory Distress] : no respiratory distress  [Soft] : abdomen soft [Normal Bowel Sounds] : normal bowel sounds [Non Tender] : non-tender [No Masses/organomegaly] : no masses/organomegaly [Moves all extremities] : moves all extremities [No Focal Deficits] : no focal deficits [No ulcers] : no ulcers [Normal Speech] : normal speech [No varicosities] : no varicosities [No edema] : no edema [No chronic venous stasis changes] : no chronic venous stasis changes [No cyanosis] : no cyanosis [No rashes] : no rashes [Normal peripheral pulses] : : normal peripheral pulses [de-identified] : systolic murmur

## 2024-03-28 NOTE — ASSESSMENT
[FreeTextEntry1] : 79 F   HTN  HLD  Systolic Murmur Dementia  EKG sinus, left axis  Prior records reviewed.   - murmur on exam, obtain TTE, none on record.  No signs of heart failure - BP controlled on olmesartan 40mg - continue crestor 5mg  - no significant leg swelling, can use compression socks as tolerated.

## 2024-04-22 ENCOUNTER — RX RENEWAL (OUTPATIENT)
Age: 79
End: 2024-04-22

## 2024-04-22 RX ORDER — ROSUVASTATIN CALCIUM 5 MG/1
5 TABLET, FILM COATED ORAL
Qty: 90 | Refills: 3 | Status: ACTIVE | COMMUNITY
Start: 2023-05-10 | End: 1900-01-01

## 2024-04-24 ENCOUNTER — APPOINTMENT (OUTPATIENT)
Dept: ENDOCRINOLOGY | Facility: CLINIC | Age: 79
End: 2024-04-24

## 2024-05-06 ENCOUNTER — LABORATORY RESULT (OUTPATIENT)
Age: 79
End: 2024-05-06

## 2024-05-14 ENCOUNTER — APPOINTMENT (OUTPATIENT)
Dept: NEUROLOGY | Facility: CLINIC | Age: 79
End: 2024-05-14
Payer: MEDICARE

## 2024-05-14 VITALS
DIASTOLIC BLOOD PRESSURE: 77 MMHG | RESPIRATION RATE: 16 BRPM | SYSTOLIC BLOOD PRESSURE: 139 MMHG | WEIGHT: 180 LBS | BODY MASS INDEX: 35.34 KG/M2 | HEART RATE: 80 BPM | OXYGEN SATURATION: 98 % | HEIGHT: 60 IN

## 2024-05-14 PROCEDURE — 99214 OFFICE O/P EST MOD 30 MIN: CPT

## 2024-05-14 RX ORDER — MEMANTINE HYDROCHLORIDE 5 MG/1
5 TABLET, FILM COATED ORAL
Qty: 60 | Refills: 0 | Status: ACTIVE | COMMUNITY
Start: 2024-05-14 | End: 1900-01-01

## 2024-05-21 NOTE — PHYSICAL EXAM
[FreeTextEntry1] : Mental Status: knows month, day and year. Knows name of hospital. Knows her age. $1.50 = 6. 0/3 recall of three words . Repeatedly asks for clarification during the appointment.  Language/Speech : speech fluent  Cranial Nerves  II: Pupils equal and reactive,  VFF full III, IV, VI: EOMI V : normal sensation in V1-V3 b/l VII : no asymmetry, no nasolabial fold flattening VIII: diminished hearing to voice Motor: no drift in limbs Sensory: normal to light touch Gait: decreased right arm swing

## 2024-05-21 NOTE — DISCUSSION/SUMMARY
[FreeTextEntry1] : Laisha is an 79-year-old RH woman with a history of hypothyroidism, depression/anxiety, htn, possible ITP presenting with memory difficulties. History is very vague. MRI brain with atrophy and microvascular changes. Cognitive testing with [facility] without significant decline from former testing. Impairment in office that is most prominent is memory. Asks for clarification numerous times in the appointment. Unclear if this is a memory or a comprehension issue.

## 2024-05-21 NOTE — DATA REVIEWED
[de-identified] : 2/20/24: MRI brain: increased parenchymal volume loss and chronic microvessel ischemic changes. Small high right frontal meningioma.  [de-identified] : 3/18/24 :mild cognitive impairment of uncertain of unknown etiology. Lack of progressive  cognitive decline argue against neurodegenerative etiology.

## 2024-05-21 NOTE — ASSESSMENT
[FreeTextEntry1] : Your cognitive testing showed you have mild cognitive impairment.  Please start memantine 5 mg at night. This is for memory. Call me in one month and if you are doing okay, I will increase dose to 5 mg ever 12 hours Please stop vitamin D - level was high Blood pressure goal < 140/80   RTC in 3-4 months to see Dr. Devi Andrade  For brain health   - healthy eating/diet for memory cognition with diet rich in fiber, fruits and vegetables and low in saturated fats, processed foods.  - good sleep, 7-8 hours a night. No use of phone or watching television before bed.  - aerobic exercise, at least 30 minutes, such as a brisk walk 3-4 times a week.  - keep mind active with puzzles, reading , socializing with others.  - abstaining from excess alcohol, drug use.  - blood pressure and cholesterol monitoring , blood glucose monitoring to prevent microvascular disease which can lead to cognitive impairment.

## 2024-05-21 NOTE — HISTORY OF PRESENT ILLNESS
[FreeTextEntry1] : Presenting with .  Came late to the appointment. Has very poor memory. Repeats questions a number of times and asks for clarification a number of times. Stopped taking vitamin D. Here to review MRI brain and neuropsych evaluation.  _____ 24  Laisha is a 79-year-old RH woman with a history of hypothyroidism, depression/anxiety, hypertension and possible idiopathic thrombocytopenic purpura. History provided by herself and her  are very limited. He states that she has had worsening memory loss for the past three years or so.  She previously saw Dr. Gail Thornton. She was diagnosed with Alzheimers and may have also had testing completed with Dr. Woodruff at [place].  states that she forgets things instantaneously and repeats things often. She is not paying attention or in the moment. This is noticeable to their children and to her  sister. She is not getting lost when driving. Left stove on once.   Medications synthroid 112 mcg daily lexapro olmesartan 40 mg  rosuvastatin 5 mg daily vitamin d 37601 units MW   Medical Condition hypothyroidism depression/anxiety hypertension high cholesterol  Surgeries as below  Social history Drives  Lives in [email or web address] Retired - [employer].   Remote smoker, hasn't smoked in years no alcohol no drug use   Family History: Mother -  - thyroid  Father -  - 86 - htn siblings - pulmonary embolism

## 2024-05-24 ENCOUNTER — APPOINTMENT (OUTPATIENT)
Dept: CARDIOLOGY | Facility: CLINIC | Age: 79
End: 2024-05-24
Payer: MEDICARE

## 2024-05-24 PROCEDURE — 93306 TTE W/DOPPLER COMPLETE: CPT

## 2024-05-29 ENCOUNTER — NON-APPOINTMENT (OUTPATIENT)
Age: 79
End: 2024-05-29

## 2024-05-30 ENCOUNTER — APPOINTMENT (OUTPATIENT)
Dept: ENDOCRINOLOGY | Facility: CLINIC | Age: 79
End: 2024-05-30
Payer: MEDICARE

## 2024-05-30 VITALS
WEIGHT: 170 LBS | BODY MASS INDEX: 32.51 KG/M2 | HEIGHT: 60.5 IN | DIASTOLIC BLOOD PRESSURE: 62 MMHG | SYSTOLIC BLOOD PRESSURE: 110 MMHG | HEART RATE: 83 BPM | OXYGEN SATURATION: 97 %

## 2024-05-30 DIAGNOSIS — M81.0 AGE-RELATED OSTEOPOROSIS W/OUT CURRENT PATHOLOGICAL FRACTURE: ICD-10-CM

## 2024-05-30 DIAGNOSIS — E03.9 HYPOTHYROIDISM, UNSPECIFIED: ICD-10-CM

## 2024-05-30 DIAGNOSIS — E67.3 HYPERVITAMINOSIS D: ICD-10-CM

## 2024-05-30 DIAGNOSIS — E66.01 MORBID (SEVERE) OBESITY DUE TO EXCESS CALORIES: ICD-10-CM

## 2024-05-30 DIAGNOSIS — N25.81 SECONDARY HYPERPARATHYROIDISM OF RENAL ORIGIN: ICD-10-CM

## 2024-05-30 PROCEDURE — G0447 BEHAVIOR COUNSEL OBESITY 15M: CPT | Mod: 59

## 2024-05-30 PROCEDURE — 99215 OFFICE O/P EST HI 40 MIN: CPT

## 2024-05-30 RX ORDER — PRENATAL VIT CALC,IRON,FOLIC
760 TABLET ORAL
Qty: 60 | Refills: 3 | Status: ACTIVE | COMMUNITY
Start: 2024-05-30 | End: 1900-01-01

## 2024-05-30 RX ORDER — LEVOTHYROXINE SODIUM 100 UG/1
100 TABLET ORAL DAILY
Qty: 90 | Refills: 3 | Status: ACTIVE | COMMUNITY
Start: 2020-12-16 | End: 1900-01-01

## 2024-05-31 PROBLEM — M81.0 AGE RELATED OSTEOPOROSIS: Status: ACTIVE | Noted: 2020-11-13

## 2024-05-31 PROBLEM — E66.01 CLASS 2 SEVERE OBESITY WITH SERIOUS COMORBIDITY AND BODY MASS INDEX (BMI) OF 38.0 TO 38.9 IN ADULT: Status: ACTIVE | Noted: 2019-03-13

## 2024-05-31 PROBLEM — N25.81 HYPERPARATHYROIDISM, SECONDARY: Status: ACTIVE | Noted: 2021-03-19

## 2024-05-31 PROBLEM — E67.3 HIGH VITAMIN D LEVEL: Status: ACTIVE | Noted: 2019-01-18

## 2024-05-31 PROBLEM — E03.9 ACQUIRED HYPOTHYROIDISM: Status: ACTIVE | Noted: 2018-12-14

## 2024-05-31 NOTE — HISTORY OF PRESENT ILLNESS
[FreeTextEntry1] : Last Reclast 1/06/24, fourth infusion.   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 she came with her sister  due to her memory problems  also treated by  Dr Gail Thornton for demnetia         hyperlipidemia, stopped Pravastatin as she had memory problems with it, still has it with Rosuvatstatin 5mg qhs memory problems are the same         also Dr Oneil for HTN        feels better since started gardening   recent right hip replacement feels better can move more         9/16 had neck laminectomy, had infection and allergy to the suture material , has had multiple herniated disks        has been on thyroid supplement since her 20's ,         Levothyroxine  137mcg labs reviewed nhightTFTs         labs reviewed good          had thyroid US 1980's was fine, repeated 2011 small thyroid, no nodules        denies FHx thyroid cancer        had forehead radiation 1948 for a skin lesion        has on and off dysphagia        had EGD Dr Lynn         has no dysphonia        denies dyspnea        thyroid US 2011 small thyroid no nodules        labs reviewed high normal thyroid, finally low normal Vit D was low prior   thyroid US 2011 MPRESSION: No focal nodules. Small heterogeneous thyroid gland similar to prior sonogram consistent with chronic thyroiditis. dexa scan 2011 normal  has chronic fatigue sees also Cardiology    2/27/2024:  Lost height 1.25 inches  No complaints  With her  today  Has been compliant with taking all of her medications  Adequate: hears normal conversation without difficulty

## 2024-05-31 NOTE — ASSESSMENT
[Little interest or pleasure doing things] : 1) Little interest or pleasure doing things [Feeling down, depressed, or hopeless] : 2) Feeling down, depressed, or hopeless [0] : 2) Feeling down, depressed, or hopeless: Not at all (0) [PHQ-2 Negative - No further assessment needed] : PHQ-2 Negative - No further assessment needed [[id number]] : 0

## 2024-06-04 ENCOUNTER — APPOINTMENT (OUTPATIENT)
Dept: GERIATRICS | Facility: CLINIC | Age: 79
End: 2024-06-04
Payer: MEDICARE

## 2024-06-04 VITALS
BODY MASS INDEX: 27.99 KG/M2 | SYSTOLIC BLOOD PRESSURE: 114 MMHG | WEIGHT: 168 LBS | OXYGEN SATURATION: 98 % | HEIGHT: 65 IN | TEMPERATURE: 98.1 F | HEART RATE: 68 BPM | DIASTOLIC BLOOD PRESSURE: 60 MMHG

## 2024-06-04 DIAGNOSIS — Z71.89 OTHER SPECIFIED COUNSELING: ICD-10-CM

## 2024-06-04 DIAGNOSIS — M25.551 PAIN IN RIGHT HIP: ICD-10-CM

## 2024-06-04 DIAGNOSIS — H91.93 UNSPECIFIED HEARING LOSS, BILATERAL: ICD-10-CM

## 2024-06-04 DIAGNOSIS — G31.84 MILD COGNITIVE IMPAIRMENT, SO STATED: ICD-10-CM

## 2024-06-04 DIAGNOSIS — Z13.820 ENCOUNTER FOR SCREENING FOR OSTEOPOROSIS: ICD-10-CM

## 2024-06-04 PROCEDURE — 90471 IMMUNIZATION ADMIN: CPT

## 2024-06-04 PROCEDURE — 99205 OFFICE O/P NEW HI 60 MIN: CPT | Mod: 25

## 2024-06-04 PROCEDURE — G0444 DEPRESSION SCREEN ANNUAL: CPT | Mod: 59

## 2024-06-04 PROCEDURE — 90715 TDAP VACCINE 7 YRS/> IM: CPT | Mod: GY

## 2024-06-04 PROCEDURE — G2211 COMPLEX E/M VISIT ADD ON: CPT | Mod: NC

## 2024-06-04 NOTE — PHYSICAL EXAM
[No Respiratory Distress] : no respiratory distress [No Acc Muscle Use] : no accessory muscle use [Respiration, Rhythm And Depth] : normal respiratory rhythm and effort [Auscultation Breath Sounds / Voice Sounds] : lungs were clear to auscultation bilaterally [Normal S1, S2] : normal S1 and S2 [Heart Rate And Rhythm] : heart rate was normal and rhythm regular [de-identified] : Having antalgic gait on the right side.  Point tenderness present upper outer quadrant of right gluteal region.

## 2024-06-04 NOTE — HISTORY OF PRESENT ILLNESS
[No falls in past year] : Patient reported no falls in the past year [Completely Independent] : Completely independent. [0] : 2) Feeling down, depressed, or hopeless: Not at all (0) [Patient reported hearing was abnormal] : Patient reported hearing was abnormal [Patient reported vision is normal] : Patient reported vision is normal [Patient reported Patient reported dental screening is normal] : Patient reported dental screening is normal [Patient reported colon/rectal/cancer screening was normal] : Patient reported colon/rectal cancer screening was normal [Patient reported breast cancer screening was normal] : Patient reported breast cancer screening was normal [Independent] : managing finances [] : Assistance needed with traveling/transport [PHQ-2 Negative - No further assessment needed] : PHQ-2 Negative - No further assessment needed [I have developed a follow-up plan documented below in the note.] : I have developed a follow-up plan documented below in the note. [Designated Healthcare Proxy] : Designated healthcare proxy [Name: ___] : Health Care Proxy's Name: [unfilled]  [Relationship: ___] : Relationship: [unfilled] [FreeTextEntry1] : PB GUEVARA is a 79 year yo White  female is coming in today to establish care. Patient has PMHx as listed below    annual wellnessDue in November  #mild dementia on memantine 5 Possible fast score 4  #Depression on lexapro   #Hypothyroidism on synthyroid 100  #HTN on olmesartan  #HLD on rosuvastatin 5   doesnt eat breakfast; doesnt cook like she used to; gets meals at senior center; doesnt feel hungry as much; have cut down synthyroid med  Education: 11thgrade Work: ; retired ETOH/Smoking: remote smoking h >25yrs ago quite; no alcohol Weight loss/malnutrition:  yes : ; 4 kids Immunization: Tdap Screening: done Depression screening: done Medication reconciliation: done Allergies: done     4M Geriatric Screening Tests   Based on The 4Ms While Caring for Older Adults, an evidence-based focus on the key areas of mentation, mobility, medications, and what matters most (a guide by the [place] for Healthcare Improvement and the [employer])     Medications: -Anticholinergic burden: no     Mobility:   -Chronic pain: no -Constipation: no -Urinary symptoms: no   Frail Scale: 0/5   -Are you fatigued? no -Can you walk up one flight of stairs?y -Can you walk one block?y -Do you have more than 5 illnesses?n -Have you lost more than 5% of your weight in last 6 months?y    has been limping since last 2 wekes. no radiating pain into the leg. right hip pain +   Mentation: -Hx of dementia: y -h/o delirium: n -Cognitive enhancing agents: y       Sleep:  8 hrs STOP-BANG Snoring: y Tiredness: y Observed Apnea:[ ] Pressure: High blood pressuren BMI: higher than 35.n Age: older than 50y Neck Circumference: greater than 16 inches is considered a risk factor.n Gender: female     Matters Most  to be physically active. she works in the garden.    [BreastSonogramDate] : 09/22 [TextBox_25] : osteopenia -2.1T; due this year [TextBox_31] : needs to wear hearing aid [TextBox_43] : every 6 months [TextBox_19] : every 2-3 yrs; due soon [FreeTextEntry3] : 11/23 [FreeTextEntry4] : due in nov with U/S [[id number]] : 0

## 2024-06-10 ENCOUNTER — RESULT REVIEW (OUTPATIENT)
Age: 79
End: 2024-06-10

## 2024-06-21 ENCOUNTER — APPOINTMENT (OUTPATIENT)
Dept: GERIATRICS | Facility: CLINIC | Age: 79
End: 2024-06-21
Payer: MEDICARE

## 2024-06-21 VITALS
HEIGHT: 65 IN | TEMPERATURE: 99.7 F | WEIGHT: 162 LBS | OXYGEN SATURATION: 97 % | HEART RATE: 113 BPM | SYSTOLIC BLOOD PRESSURE: 116 MMHG | DIASTOLIC BLOOD PRESSURE: 82 MMHG | BODY MASS INDEX: 26.99 KG/M2

## 2024-06-21 DIAGNOSIS — F42.4 EXCORIATION (SKIN-PICKING) DISORDER: ICD-10-CM

## 2024-06-21 DIAGNOSIS — F03.A0 UNSPECIFIED DEMENTIA, MILD, WITHOUT BEHAVIORAL DISTURBANCE, PSYCHOTIC DISTURBANCE, MOOD DISTURBANCE, AND ANXIETY: ICD-10-CM

## 2024-06-21 PROCEDURE — 99215 OFFICE O/P EST HI 40 MIN: CPT

## 2024-06-21 PROCEDURE — G2211 COMPLEX E/M VISIT ADD ON: CPT

## 2024-06-21 RX ORDER — SERTRALINE 25 MG/1
25 TABLET, FILM COATED ORAL DAILY
Qty: 60 | Refills: 0 | Status: ACTIVE | COMMUNITY
Start: 2024-06-21 | End: 1900-01-01

## 2024-06-21 RX ORDER — ESCITALOPRAM OXALATE 20 MG/1
20 TABLET ORAL
Qty: 30 | Refills: 3 | Status: DISCONTINUED | COMMUNITY
End: 2024-06-21

## 2024-06-21 NOTE — PHYSICAL EXAM
[No Respiratory Distress] : no respiratory distress [No Acc Muscle Use] : no accessory muscle use [Respiration, Rhythm And Depth] : normal respiratory rhythm and effort [Auscultation Breath Sounds / Voice Sounds] : lungs were clear to auscultation bilaterally [Normal S1, S2] : normal S1 and S2 [Heart Rate And Rhythm] : heart rate was normal and rhythm regular [de-identified] : Has multiple skin wounds over bilateral shins at different stage of healing.  She also has darkening of her skin over the shin regions.  She has right elbow scabs and pigmentation present as well.

## 2024-06-21 NOTE — HISTORY OF PRESENT ILLNESS
[FreeTextEntry1] : Patient is 79-year-old female with past medical history of depression, mild dementia, hypothyroidism, hypertension, hyperlipidemia Need Alzheimer Association referral. Need to fill MOLST form  Has been having skin picking for years over her legs. Still shaves her legs. she feels like she is not having itching but she does scratch them all the time.   Seen Dermatologist 1 month ago. gave her   06/04/2024 PB GUEVARA is a 79 year yo White  female is coming in today to establish care. Patient has PMHx as listed below    annual wellnessDue in November  #mild dementia on memantine 5 Possible fast score 4  #Depression on lexapro   #Hypothyroidism on synthyroid 100  #HTN on olmesartan  #HLD on rosuvastatin 5   doesnt eat breakfast; doesnt cook like she used to; gets meals at [employer]; doesnt feel hungry as much; have cut down synthyroid med  Education: 11thgrade Work: ; retired ETOH/Smoking: remote smoking h >25yrs ago quite; no alcohol Weight loss/malnutrition:  yes : ; 4 kids Immunization: Tdap Screening: done Depression screening: done Medication reconciliation: done Allergies: done     4M Geriatric Screening Tests   Based on The 4Ms While Caring for Older Adults, an evidence-based focus on the key areas of mentation, mobility, medications, and what matters most (a guide by the [place] for Healthcare [employer] and the [employer])     Medications: -Anticholinergic burden: no     Mobility:   -Chronic pain: no -Constipation: no -Urinary symptoms: no   Frail Scale: 0/5   -Are you fatigued? no -Can you walk up one flight of stairs?y -Can you walk one block?y -Do you have more than 5 illnesses?n -Have you lost more than 5% of your weight in last 6 months?y    has been limping since last 2 wekes. no radiating pain into the leg. right hip pain +   Mentation: -Hx of dementia: y -h/o delirium: n -Cognitive enhancing agents: y       Sleep:  8 hrs STOP-BANG Snoring: y Tiredness: y Observed Apnea:[ ] Pressure: High blood pressuren BMI: higher than 35.n Age: older than 50y Neck Circumference: greater than 16 inches is considered a risk factor.n Gender: female     Matters Most  to be physically active. she works in the garden.    [] : 0

## 2024-07-10 ENCOUNTER — APPOINTMENT (OUTPATIENT)
Dept: GERIATRICS | Facility: CLINIC | Age: 79
End: 2024-07-10
Payer: MEDICARE

## 2024-07-10 VITALS
TEMPERATURE: 99.6 F | DIASTOLIC BLOOD PRESSURE: 62 MMHG | HEART RATE: 98 BPM | WEIGHT: 162 LBS | OXYGEN SATURATION: 97 % | SYSTOLIC BLOOD PRESSURE: 122 MMHG | BODY MASS INDEX: 26.99 KG/M2 | HEIGHT: 65 IN

## 2024-07-10 DIAGNOSIS — F42.4 EXCORIATION (SKIN-PICKING) DISORDER: ICD-10-CM

## 2024-07-10 DIAGNOSIS — F03.A0 UNSPECIFIED DEMENTIA, MILD, WITHOUT BEHAVIORAL DISTURBANCE, PSYCHOTIC DISTURBANCE, MOOD DISTURBANCE, AND ANXIETY: ICD-10-CM

## 2024-07-10 PROCEDURE — G0444 DEPRESSION SCREEN ANNUAL: CPT | Mod: 59

## 2024-07-10 PROCEDURE — G2211 COMPLEX E/M VISIT ADD ON: CPT

## 2024-07-10 PROCEDURE — 99215 OFFICE O/P EST HI 40 MIN: CPT

## 2024-07-10 RX ORDER — LEVOCETIRIZINE DIHYDROCHLORIDE 5 MG/1
5 TABLET ORAL DAILY
Qty: 60 | Refills: 0 | Status: ACTIVE | COMMUNITY
Start: 2024-07-10 | End: 1900-01-01

## 2024-07-11 ENCOUNTER — APPOINTMENT (OUTPATIENT)
Dept: GERIATRICS | Facility: CLINIC | Age: 79
End: 2024-07-11
Payer: MEDICARE

## 2024-09-16 DIAGNOSIS — E66.01 MORBID (SEVERE) OBESITY DUE TO EXCESS CALORIES: ICD-10-CM

## 2024-09-18 ENCOUNTER — APPOINTMENT (OUTPATIENT)
Dept: GERIATRICS | Facility: CLINIC | Age: 79
End: 2024-09-18
Payer: MEDICARE

## 2024-09-18 ENCOUNTER — APPOINTMENT (OUTPATIENT)
Dept: GERIATRICS | Facility: CLINIC | Age: 79
End: 2024-09-18

## 2024-09-18 VITALS
DIASTOLIC BLOOD PRESSURE: 62 MMHG | HEART RATE: 68 BPM | HEIGHT: 65 IN | SYSTOLIC BLOOD PRESSURE: 100 MMHG | WEIGHT: 168 LBS | BODY MASS INDEX: 27.99 KG/M2 | TEMPERATURE: 99.2 F | OXYGEN SATURATION: 95 %

## 2024-09-18 DIAGNOSIS — Z71.89 OTHER SPECIFIED COUNSELING: ICD-10-CM

## 2024-09-18 DIAGNOSIS — F42.4 EXCORIATION (SKIN-PICKING) DISORDER: ICD-10-CM

## 2024-09-18 DIAGNOSIS — G31.84 MILD COGNITIVE IMPAIRMENT, SO STATED: ICD-10-CM

## 2024-09-18 PROCEDURE — G0444 DEPRESSION SCREEN ANNUAL: CPT | Mod: 59

## 2024-09-18 PROCEDURE — 99483 ASSMT & CARE PLN PT COG IMP: CPT

## 2024-09-18 PROCEDURE — G2211 COMPLEX E/M VISIT ADD ON: CPT

## 2024-09-23 ENCOUNTER — APPOINTMENT (OUTPATIENT)
Dept: ENDOCRINOLOGY | Facility: CLINIC | Age: 79
End: 2024-09-23
Payer: MEDICARE

## 2024-09-23 VITALS
HEIGHT: 65 IN | DIASTOLIC BLOOD PRESSURE: 80 MMHG | SYSTOLIC BLOOD PRESSURE: 126 MMHG | OXYGEN SATURATION: 99 % | WEIGHT: 170 LBS | BODY MASS INDEX: 28.32 KG/M2 | HEART RATE: 68 BPM

## 2024-09-23 DIAGNOSIS — N25.81 SECONDARY HYPERPARATHYROIDISM OF RENAL ORIGIN: ICD-10-CM

## 2024-09-23 DIAGNOSIS — E03.9 HYPOTHYROIDISM, UNSPECIFIED: ICD-10-CM

## 2024-09-23 DIAGNOSIS — E66.01 MORBID (SEVERE) OBESITY DUE TO EXCESS CALORIES: ICD-10-CM

## 2024-09-23 DIAGNOSIS — E78.5 HYPERLIPIDEMIA, UNSPECIFIED: ICD-10-CM

## 2024-09-23 DIAGNOSIS — E53.8 DEFICIENCY OF OTHER SPECIFIED B GROUP VITAMINS: ICD-10-CM

## 2024-09-23 DIAGNOSIS — M81.0 AGE-RELATED OSTEOPOROSIS W/OUT CURRENT PATHOLOGICAL FRACTURE: ICD-10-CM

## 2024-09-23 DIAGNOSIS — E78.49 OTHER HYPERLIPIDEMIA: ICD-10-CM

## 2024-09-23 DIAGNOSIS — E67.3 HYPERVITAMINOSIS D: ICD-10-CM

## 2024-09-23 LAB
24R-OH-CALCIDIOL SERPL-MCNC: 68.5 PG/ML
25(OH)D3 SERPL-MCNC: 43.4 NG/ML
ALBUMIN SERPL ELPH-MCNC: 4.1 G/DL
ALP BLD-CCNC: 122 U/L
ALT SERPL-CCNC: 11 U/L
ANION GAP SERPL CALC-SCNC: 14 MMOL/L
AST SERPL-CCNC: 19 U/L
BILIRUB SERPL-MCNC: 0.3 MG/DL
BUN SERPL-MCNC: 21 MG/DL
CALCIUM SERPL-MCNC: 9.2 MG/DL
CALCIUM SERPL-MCNC: 9.2 MG/DL
CHLORIDE SERPL-SCNC: 105 MMOL/L
CHOLEST SERPL-MCNC: 184 MG/DL
CO2 SERPL-SCNC: 22 MMOL/L
CREAT SERPL-MCNC: 1.01 MG/DL
EGFR: 56 ML/MIN/1.73M2
ESTIMATED AVERAGE GLUCOSE: 105 MG/DL
GLUCOSE SERPL-MCNC: 108 MG/DL
HBA1C MFR BLD HPLC: 5.3 %
HDLC SERPL-MCNC: 62 MG/DL
LDLC SERPL CALC-MCNC: 106 MG/DL
MAGNESIUM SERPL-MCNC: 2.1 MG/DL
NONHDLC SERPL-MCNC: 122 MG/DL
PARATHYROID HORMONE INTACT: 53 PG/ML
PHOSPHATE SERPL-MCNC: 3 MG/DL
POTASSIUM SERPL-SCNC: 4.3 MMOL/L
PROT SERPL-MCNC: 6.3 G/DL
SODIUM SERPL-SCNC: 141 MMOL/L
T4 FREE SERPL-MCNC: 1.4 NG/DL
TRIGL SERPL-MCNC: 84 MG/DL
TSH SERPL-ACNC: 5.24 UIU/ML

## 2024-09-23 PROCEDURE — G0447 BEHAVIOR COUNSEL OBESITY 15M: CPT | Mod: 59

## 2024-09-23 PROCEDURE — 99215 OFFICE O/P EST HI 40 MIN: CPT

## 2024-09-23 NOTE — HISTORY OF PRESENT ILLNESS
[FreeTextEntry1] : Last Reclast 1/06/24, fourth infusion.   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 she came with her sister  due to her memory problems  also treated by  Dr Gail Thornton for demnetia         hyperlipidemia, stopped Pravastatin as she had memory problems with it, still has it with Rosuvatstatin 5mg qhs memory problems are the same         also Dr Oneil for HTN        feels better since started gardening   recent right hip replacement feels better can move more         9/16 had neck laminectomy, had infection and allergy to the suture material , has had multiple herniated disks        has been on thyroid supplement since her 20's ,         Levothyroxine  137mcg labs reviewed nhightTFTs         labs reviewed good          had thyroid US 1980's was fine, repeated 2011 small thyroid, no nodules        denies FHx thyroid cancer        had forehead radiation 1948 for a skin lesion        has on and off dysphagia        had EGD Dr Lynn         has no dysphonia        denies dyspnea        thyroid US 2011 small thyroid no nodules        labs reviewed high normal thyroid, finally low normal Vit D was low prior   thyroid US 2011 MPRESSION: No focal nodules. Small heterogeneous thyroid gland similar to prior sonogram consistent with chronic thyroiditis. dexa scan 2011 normal  has chronic fatigue sees also Cardiology    2/27/2024:  Lost height 1.25 inches  No complaints  With her  today  Has been compliant with taking all of her medications  9/23/24 today with her  who believes her memory is a little worse has corine with new Neurologist at [place]  eating more ice cream does gardening sees Neurology  denies any alcohol

## 2024-09-23 NOTE — ASSESSMENT
[Little interest or pleasure doing things] : 1) Little interest or pleasure doing things [Feeling down, depressed, or hopeless] : 2) Feeling down, depressed, or hopeless [0] : 2) Feeling down, depressed, or hopeless: Not at all (0) [PHQ-2 Negative - No further assessment needed] : PHQ-2 Negative - No further assessment needed [[id number]] : 0

## 2024-09-23 NOTE — HISTORY OF PRESENT ILLNESS
[FreeTextEntry1] : Last Reclast 1/06/24, fourth infusion.   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 she came with her sister  due to her memory problems  also treated by  Dr Gail Thornton for demnetia         hyperlipidemia, stopped Pravastatin as she had memory problems with it, still has it with Rosuvatstatin 5mg qhs memory problems are the same         also Dr Oneil for HTN        feels better since started gardening   recent right hip replacement feels better can move more         9/16 had neck laminectomy, had infection and allergy to the suture material , has had multiple herniated disks        has been on thyroid supplement since her 20's ,         Levothyroxine  137mcg labs reviewed nhightTFTs         labs reviewed good          had thyroid US 1980's was fine, repeated 2011 small thyroid, no nodules        denies FHx thyroid cancer        had forehead radiation 1948 for a skin lesion        has on and off dysphagia        had EGD Dr Lynn         has no dysphonia        denies dyspnea        thyroid US 2011 small thyroid no nodules        labs reviewed high normal thyroid, finally low normal Vit D was low prior   thyroid US 2011 MPRESSION: No focal nodules. Small heterogeneous thyroid gland similar to prior sonogram consistent with chronic thyroiditis. dexa scan 2011 normal  has chronic fatigue sees also Cardiology    2/27/2024:  Lost height 1.25 inches  No complaints  With her  today  Has been compliant with taking all of her medications  9/23/24 today with her  who believes her memory is a little worse has corine with new Neurologist at [place]  eating more ice cream does gardening sees Neurology  denies any alcohol

## 2024-09-23 NOTE — ASSESSMENT
[Little interest or pleasure doing things] : 1) Little interest or pleasure doing things [Feeling down, depressed, or hopeless] : 2) Feeling down, depressed, or hopeless [0] : 2) Feeling down, depressed, or hopeless: Not at all (0) [PHQ-2 Negative - No further assessment needed] : PHQ-2 Negative - No further assessment needed [[id number]] : 0

## 2024-09-26 ENCOUNTER — APPOINTMENT (OUTPATIENT)
Dept: NEUROLOGY | Facility: CLINIC | Age: 79
End: 2024-09-26
Payer: MEDICARE

## 2024-09-26 VITALS
BODY MASS INDEX: 28.29 KG/M2 | WEIGHT: 170 LBS | OXYGEN SATURATION: 97 % | DIASTOLIC BLOOD PRESSURE: 67 MMHG | HEART RATE: 72 BPM | SYSTOLIC BLOOD PRESSURE: 103 MMHG

## 2024-09-26 DIAGNOSIS — F03.A0 UNSPECIFIED DEMENTIA, MILD, WITHOUT BEHAVIORAL DISTURBANCE, PSYCHOTIC DISTURBANCE, MOOD DISTURBANCE, AND ANXIETY: ICD-10-CM

## 2024-09-26 DIAGNOSIS — R41.3 OTHER AMNESIA: ICD-10-CM

## 2024-09-26 DIAGNOSIS — Z63.6 DEPENDENT RELATIVE NEEDING CARE AT HOME: ICD-10-CM

## 2024-09-26 DIAGNOSIS — H91.93 UNSPECIFIED HEARING LOSS, BILATERAL: ICD-10-CM

## 2024-09-26 PROCEDURE — 99215 OFFICE O/P EST HI 40 MIN: CPT

## 2024-09-26 SDOH — SOCIAL STABILITY - SOCIAL INSECURITY: DEPENDENT RELATIVE NEEDING CARE AT HOME: Z63.6

## 2024-09-27 PROBLEM — Z63.6 CAREGIVER BURDEN: Status: ACTIVE | Noted: 2024-09-27

## 2024-09-27 LAB — COLLAGEN CTX SERPL-MCNC: 333 PG/ML

## 2024-09-27 RX ORDER — MEMANTINE HYDROCHLORIDE 5 MG/1
5 TABLET, FILM COATED ORAL
Qty: 120 | Refills: 0 | Status: ACTIVE | COMMUNITY
Start: 2024-05-14 | End: 1900-01-01

## 2024-09-27 RX ORDER — CLOBETASOL PROPIONATE 0.5 MG/G
0.05 OINTMENT TOPICAL TWICE DAILY
Qty: 1 | Refills: 3 | Status: ACTIVE | COMMUNITY
Start: 2024-05-08 | End: 1900-01-01

## 2024-09-27 RX ORDER — AMMONIUM LACTATE 12 %
12 CREAM (GRAM) TOPICAL TWICE DAILY
Qty: 1 | Refills: 9 | Status: ACTIVE | COMMUNITY
Start: 2024-05-09 | End: 1900-01-01

## 2024-09-27 NOTE — DATA REVIEWED
[de-identified] : 2/20/24: MRI brain: increased parenchymal volume loss and chronic microvessel ischemic changes. Small high right frontal meningioma.  [de-identified] : 3/18/24 :mild cognitive impairment of uncertain of unknown etiology. Lack of progressive  cognitive decline argue against neurodegenerative etiology.  [de-identified] :  9/16/24 vitamin d 43.4, TSH 5.24, Cr 1.01, GFR 56, a1c 5.3.

## 2024-09-27 NOTE — HISTORY OF PRESENT ILLNESS
[FreeTextEntry1] : 24 Laisha is escorted today by her  Sandro.   memory -  reports memory has worsened since last visit. Forgets conversations right away. Poor insight into memory loss.   behavioral changes - no wandering, able to make herself a sandwich, pt does the laundry,  helps oversee billing.   memantine 10 mg every 12 hours, denies adverse effects   Anxiety sertraline 25mg daily   24 vitamin d 43.4, TSH 5.24, Cr 1.01, GFR 56, a1c 5.3.   Exercise - not exercising. We discussed taking a 20 mins walk 4 times weekly   Medication blister packs are recommended for pt.  states he will try to set up at the pharmacy [facility]. He agrees to discuss blister packs with the new pharmacist as he is setting up an account.   Water - not drinking enough water. We discussed adding lemon to water and offering encouragement.   Blood pressure goal < 140/80. /67. Advised  to f/u with pt's pcp.    reports feeling overwhelmed with little help at home. An email will be sent to Rabia DUQUE who he is familiar with, to assist with hiring a caregiver for a few hours weekly to help with organizing pt's medications and with various household tasks for pt. Pt has poor insight.    Bethanie Oneal NP collaborated with the neurologist during today's visit.   ********************************* Presenting with .  Came late to the appointment. Has very poor memory. Repeats questions a number of times and asks for clarification a number of times. Stopped taking vitamin D. Here to review MRI brain and neuropsych evaluation.  _____ 24  Laisha is a 79-year-old RH woman with a history of hypothyroidism, depression/anxiety, hypertension and possible idiopathic thrombocytopenic purpura. History provided by herself and her  are very limited. He states that she has had worsening memory loss for the past three years or so.  She previously saw Dr. Gail Thornton. She was diagnosed with Alzheimers and may have also had testing completed with Dr. Woodruff at [place].  states that she forgets things instantaneously and repeats things often. She is not paying attention or in the moment. This is noticeable to their children and to her  sister. She is not getting lost when driving. Left stove on once.   Medications synthroid 112 mcg daily lexapro olmesartan 40 mg  rosuvastatin 5 mg daily vitamin d 11936 units MW   Medical Condition hypothyroidism depression/anxiety hypertension high cholesterol  Surgeries as below  Social history Drives  Lives in [place] Retired - , [employer].   Remote smoker, hasn't smoked in years no alcohol no drug use   Family History: Mother -  - thyroid  Father -  -  - htn siblings - pulmonary embolism

## 2024-09-27 NOTE — DATA REVIEWED
[de-identified] : 2/20/24: MRI brain: increased parenchymal volume loss and chronic microvessel ischemic changes. Small high right frontal meningioma.  [de-identified] : 3/18/24 :mild cognitive impairment of uncertain of unknown etiology. Lack of progressive  cognitive decline argue against neurodegenerative etiology.  [de-identified] :  9/16/24 vitamin d 43.4, TSH 5.24, Cr 1.01, GFR 56, a1c 5.3.

## 2024-09-27 NOTE — ASSESSMENT
[FreeTextEntry1] : mild cognitive impairment - Continue memantine 10 mg every 12 hours Driving evaluation ordered today. Pt is still driving.  states pt drives only when he is in the car.  [facility]  Stacia contacted to assist pt and  hire help and create an account at a pharmacy that offers blister packs for pt   Blood pressure goal < 140/80, > 110/60, regular f/u appts with pcp to monitor BP.   Water - please drink more water. The current goal is 6 cups water daily. Add lemon if preferred  Exercise - take a 20 mins walk 4 times daily  Sleep hygiene is recommended.   RTC in 3-4 months to see NP Bethanie Oneal   For brain health  - healthy eating/diet for memory cognition with diet rich in fiber, fruits and vegetables and low in saturated fats, processed foods.  - good sleep, 7-8 hours a night. No use of phone or watching television before bed.  - aerobic exercise, at least 30 minutes, such as a brisk walk 3-4 times a week.  - keep mind active with puzzles, reading , socializing with others.  - abstaining from excess alcohol, drug use.  - blood pressure and cholesterol monitoring , blood glucose monitoring to prevent microvascular disease which can lead to cognitive impairment.

## 2024-09-27 NOTE — PHYSICAL EXAM
[FreeTextEntry1] : Mental Status: does NOT know month, NOT day and NOT year (deterioration from last visit) Knows name of hospital. Knows  and her age. $1.75 = 7. 0/3 recall of three words. Repeatedly states her home is very clean and she is managing her own meds.  Language/Speech : speech fluent  Cranial Nerves  II: Pupils equal and reactive,  VFF full III, IV, VI: EOMI V : normal sensation in V1-V3 b/l VII : no asymmetry, no nasolabial fold flattening VIII: diminished hearing to voice, not wearing hearing aids today  Motor: no drift in limbs Sensory: normal to light touch Gait: decreased right arm swing

## 2024-09-27 NOTE — DATA REVIEWED
[de-identified] : 2/20/24: MRI brain: increased parenchymal volume loss and chronic microvessel ischemic changes. Small high right frontal meningioma.  [de-identified] : 3/18/24 :mild cognitive impairment of uncertain of unknown etiology. Lack of progressive  cognitive decline argue against neurodegenerative etiology.  [de-identified] :  9/16/24 vitamin d 43.4, TSH 5.24, Cr 1.01, GFR 56, a1c 5.3.

## 2024-09-27 NOTE — ASSESSMENT
[FreeTextEntry1] : mild cognitive impairment - Continue memantine 10 mg every 12 hours Driving evaluation ordered today. Pt is still driving.  states pt drives only when he is in the car.  [facility]  Stacia contacted to assist pt and  hire help and create an account at a pharmacy that offers blister packs for pt   Blood pressure goal < 140/80, > 110/60, regular f/u appts with pcp to monitor BP.   Water - please drink more water. The current goal is 6 cups water daily. Add lemon if preferred  Exercise - take a 20 mins walk 4 times daily  Sleep hygiene is recommended.   RTC in 3-4 months to see NP Bethanie Oneal   For brain health  - healthy eating/diet for memory cognition with diet rich in fiber, fruits and vegetables and low in saturated fats, processed foods.  - good sleep, 7-8 hours a night. No use of phone or watching television before bed.  - aerobic exercise, at least 30 minutes, such as a brisk walk 3-4 times a week.  - keep mind active with puzzles, reading , socializing with others.  - abstaining from excess alcohol, drug use.  - blood pressure and cholesterol monitoring , blood glucose monitoring to prevent microvascular disease which can lead to cognitive impairment.

## 2024-09-27 NOTE — HISTORY OF PRESENT ILLNESS
[FreeTextEntry1] : 24 Laisha is escorted today by her  Sandro.   memory -  reports memory has worsened since last visit. Forgets conversations right away. Poor insight into memory loss.   behavioral changes - no wandering, able to make herself a sandwich, pt does the laundry,  helps oversee billing.   memantine 10 mg every 12 hours, denies adverse effects   Anxiety sertraline 25mg daily   24 vitamin d 43.4, TSH 5.24, Cr 1.01, GFR 56, a1c 5.3.   Exercise - not exercising. We discussed taking a 20 mins walk 4 times weekly   Medication blister packs are recommended for pt.  states he will try to set up at [facility]. He agrees to discuss blister packs with the new pharmacist as he is setting up an account.   Water - not drinking enough water. We discussed adding lemon to water and offering encouragement.   Blood pressure goal < 140/80. /67. Advised  to f/u with pt's pcp.    reports feeling overwhelmed with little help at home. An email will be sent to Rabia DUQUE who he is familiar with, to assist with hiring a caregiver for a few hours weekly to help with organizing pt's medications and with various household tasks for pt. Pt has poor insight.    Bethanie Oneal NP collaborated with the neurologist during today's visit.   ********************************* Presenting with .  Came late to the appointment. Has very poor memory. Repeats questions a number of times and asks for clarification a number of times. Stopped taking vitamin D. Here to review MRI brain and neuropsych evaluation.  _____ 24  Laisha is a 79-year-old RH woman with a history of hypothyroidism, depression/anxiety, hypertension and possible idiopathic thrombocytopenic purpura. History provided by herself and her  are very limited. He states that she has had worsening memory loss for the past three years or so.  She previously saw Dr. Gail Thornton. She was diagnosed with Alzheimers and may have also had testing completed with Dr. Woodruff at [place].  states that she forgets things instantaneously and repeats things often. She is not paying attention or in the moment. This is noticeable to their children and to her  sister. She is not getting lost when driving. Left stove on once.   Medications synthroid 112 mcg daily lexapro olmesartan 40 mg  rosuvastatin 5 mg daily vitamin d 02251 units MW   Medical Condition hypothyroidism depression/anxiety hypertension high cholesterol  Surgeries as below  Social history Drives  Lives in [place] Retired - , [employer].   Remote smoker, hasn't smoked in years no alcohol no drug use   Family History: Mother -  - thyroid  Father -  -  - htn siblings - pulmonary embolism

## 2024-09-27 NOTE — HISTORY OF PRESENT ILLNESS
[FreeTextEntry1] : 24 Laisha is escorted today by her  Sandro.   memory -  reports memory has worsened since last visit. Forgets conversations right away. Poor insight into memory loss.   behavioral changes - no wandering, able to make herself a sandwich, pt does the laundry,  helps oversee billing.   memantine 10 mg every 12 hours, denies adverse effects   Anxiety sertraline 25mg daily   24 vitamin d 43.4, TSH 5.24, Cr 1.01, GFR 56, a1c 5.3.   Exercise - not exercising. We discussed taking a 20 mins walk 4 times weekly   Medication blister packs are recommended for pt.  states he will try to set up at [facility]. He agrees to discuss blister packs with the new pharmacist as he is setting up an account.   Water - not drinking enough water. We discussed adding lemon to water and offering encouragement.   Blood pressure goal < 140/80. /67. Advised  to f/u with pt's pcp.    reports feeling overwhelmed with little help at home. An email will be sent to Rabia DUQUE who he is familiar with, to assist with hiring a caregiver for a few hours weekly to help with organizing pt's medications and with various household tasks for pt. Pt has poor insight.    Bethanie Oneal NP collaborated with the neurologist during today's visit.   ********************************* Presenting with .  Came late to the appointment. Has very poor memory. Repeats questions a number of times and asks for clarification a number of times. Stopped taking vitamin D. Here to review MRI brain and neuropsych evaluation.  _____ 24  Laisha is a 79-year-old RH woman with a history of hypothyroidism, depression/anxiety, hypertension and possible idiopathic thrombocytopenic purpura. History provided by herself and her  are very limited. He states that she has had worsening memory loss for the past three years or so.  She previously saw Dr. Gail Thornton. She was diagnosed with Alzheimers and may have also had testing completed with Dr. Woodruff at [place].  states that she forgets things instantaneously and repeats things often. She is not paying attention or in the moment. This is noticeable to their children and to her  sister. She is not getting lost when driving. Left stove on once.   Medications synthroid 112 mcg daily lexapro olmesartan 40 mg  rosuvastatin 5 mg daily vitamin d 48237 units MW   Medical Condition hypothyroidism depression/anxiety hypertension high cholesterol  Surgeries as below  Social history Drives  Lives in [place] Retired - , [employer].   Remote smoker, hasn't smoked in years no alcohol no drug use   Family History: Mother -  - thyroid  Father -  -  - htn siblings - pulmonary embolism

## 2024-09-27 NOTE — DISCUSSION/SUMMARY
[FreeTextEntry1] : Laisha is an 79-year-old RH woman with a history of hypothyroidism, depression/anxiety, htn, possible ITP presenting with memory difficulties. History is very vague. MRI brain with atrophy and microvascular changes. Cognitive testing with [facility] without significant decline from former testing. Impairment in office that is most prominent is memory. Asks for clarification numerous times in the appointment. Unclear if this is a memory or a comprehension issue.  9/27/24 Poor insight into memory loss. Repeatedly states she manages her own meds  and cleans the house.  is a little overwhelmed with challenge of overseeing pt's medications and wishing to switch to blister packs. Stacia finnegan will be contacted to assist pt and  hire help.

## 2024-09-27 NOTE — ASSESSMENT
[FreeTextEntry1] : mild cognitive impairment - Continue memantine 10 mg every 12 hours Driving evaluation ordered today. Pt is still driving.  states pt drives only when he is in the car.  [facility]  Stacia contacted to assist pt and  hire help and create an account at a pharmacy that offers blister packs for pt   Blood pressure goal < 140/80, > 110/60, regular f/u appts with pcp to monitor BP.   Water - please drink more water. The current goal is 6 cups water daily. Add lemon if preferred  Exercise - take a 20 mins walk 4 times daily  Sleep hygiene is recommended.   RTC in 3-4 months to see NP Bethanie Oneal   For brain health  - healthy eating/diet for memory cognition with diet rich in fiber, fruits and vegetables and low in saturated fats, processed foods.  - good sleep, 7-8 hours a night. No use of phone or watching television before bed.  - aerobic exercise, at least 30 minutes, such as a brisk walk 3-4 times a week.  - keep mind active with puzzles, reading , socializing with others.  - abstaining from excess alcohol, drug use.  - blood pressure and cholesterol monitoring , blood glucose monitoring to prevent microvascular disease which can lead to cognitive impairment.

## 2024-09-27 NOTE — HISTORY OF PRESENT ILLNESS
[FreeTextEntry1] : 24 Laisha is escorted today by her  Sandro.   memory -  reports memory has worsened since last visit. Forgets conversations right away. Poor insight into memory loss.   behavioral changes - no wandering, able to make herself a sandwich, pt does the laundry,  helps oversee billing.   memantine 10 mg every 12 hours, denies adverse effects   Anxiety sertraline 25mg daily   24 vitamin d 43.4, TSH 5.24, Cr 1.01, GFR 56, a1c 5.3.   Exercise - not exercising. We discussed taking a 20 mins walk 4 times weekly   Medication blister packs are recommended for pt.  states he will try to set up at [facility]. He agrees to discuss blister packs with the new pharmacist as he is setting up an account.   Water - not drinking enough water. We discussed adding lemon to water and offering encouragement.   Blood pressure goal < 140/80. /67. Advised  to f/u with pt's pcp.    reports feeling overwhelmed with little help at home. An email will be sent to Rabia DUQUE who he is familiar with, to assist with hiring a caregiver for a few hours weekly to help with organizing pt's medications and with various household tasks for pt. Pt has poor insight.    Bethanie Oneal NP collaborated with the neurologist during today's visit.   ********************************* Presenting with .  Came late to the appointment. Has very poor memory. Repeats questions a number of times and asks for clarification a number of times. Stopped taking vitamin D. Here to review MRI brain and neuropsych evaluation.  _____ 24  Laisha is a 79-year-old RH woman with a history of hypothyroidism, depression/anxiety, hypertension and possible idiopathic thrombocytopenic purpura. History provided by herself and her  are very limited. He states that she has had worsening memory loss for the past three years or so.  She previously saw Dr. Gail Thornton. She was diagnosed with Alzheimers and may have also had testing completed with Dr. Woodruff at [place].  states that she forgets things instantaneously and repeats things often. She is not paying attention or in the moment. This is noticeable to their children and to her  sister. She is not getting lost when driving. Left stove on once.   Medications synthroid 112 mcg daily lexapro olmesartan 40 mg  rosuvastatin 5 mg daily vitamin d 69713 units MW   Medical Condition hypothyroidism depression/anxiety hypertension high cholesterol  Surgeries as below  Social history Drives  Lives in [place] Retired - , [employer]   Remote smoker, hasn't smoked in years no alcohol no drug use   Family History: Mother -  - thyroid  Father -  -  - htn siblings - pulmonary embolism

## 2024-09-27 NOTE — ASSESSMENT
[FreeTextEntry1] : mild cognitive impairment - Continue memantine 10 mg every 12 hours Driving evaluation ordered today. Pt is still driving.  states pt drives only when he is in the car.  [facility]  Stacia contacted to assist pt and  hire help and create an account at a pharmacy that offers blister packs for pt   Blood pressure goal < 140/80, > 110/60, regular f/u appts with pcp to monitor BP.   Water - please drink more water. The current goal is 6 cups water daily. Add lemon if preferred  Exercise - take a 20 mins walk 4 times daily  Sleep hygiene is recommended.   RTC in 3-4 months to see NP Btehanie Oneal   For brain health  - healthy eating/diet for memory cognition with diet rich in fiber, fruits and vegetables and low in saturated fats, processed foods.  - good sleep, 7-8 hours a night. No use of phone or watching television before bed.  - aerobic exercise, at least 30 minutes, such as a brisk walk 3-4 times a week.  - keep mind active with puzzles, reading , socializing with others.  - abstaining from excess alcohol, drug use.  - blood pressure and cholesterol monitoring , blood glucose monitoring to prevent microvascular disease which can lead to cognitive impairment.

## 2024-10-30 ENCOUNTER — APPOINTMENT (OUTPATIENT)
Dept: GERIATRICS | Facility: CLINIC | Age: 79
End: 2024-10-30
Payer: MEDICARE

## 2024-10-30 VITALS
WEIGHT: 168 LBS | HEIGHT: 65 IN | TEMPERATURE: 98.7 F | DIASTOLIC BLOOD PRESSURE: 56 MMHG | OXYGEN SATURATION: 98 % | HEART RATE: 68 BPM | SYSTOLIC BLOOD PRESSURE: 114 MMHG | BODY MASS INDEX: 27.99 KG/M2

## 2024-10-30 DIAGNOSIS — F03.A0 UNSPECIFIED DEMENTIA, MILD, WITHOUT BEHAVIORAL DISTURBANCE, PSYCHOTIC DISTURBANCE, MOOD DISTURBANCE, AND ANXIETY: ICD-10-CM

## 2024-10-30 DIAGNOSIS — Z71.89 OTHER SPECIFIED COUNSELING: ICD-10-CM

## 2024-10-30 DIAGNOSIS — F42.4 EXCORIATION (SKIN-PICKING) DISORDER: ICD-10-CM

## 2024-10-30 DIAGNOSIS — E78.5 HYPERLIPIDEMIA, UNSPECIFIED: ICD-10-CM

## 2024-10-30 PROCEDURE — G0439: CPT

## 2024-10-30 PROCEDURE — 99497 ADVNCD CARE PLAN 30 MIN: CPT

## 2024-11-14 ENCOUNTER — RESULT REVIEW (OUTPATIENT)
Age: 79
End: 2024-11-14

## 2024-11-15 ENCOUNTER — APPOINTMENT (OUTPATIENT)
Dept: BREAST CENTER | Facility: CLINIC | Age: 79
End: 2024-11-15
Payer: MEDICARE

## 2024-11-15 VITALS
DIASTOLIC BLOOD PRESSURE: 75 MMHG | BODY MASS INDEX: 29.77 KG/M2 | SYSTOLIC BLOOD PRESSURE: 135 MMHG | WEIGHT: 168 LBS | OXYGEN SATURATION: 97 % | HEIGHT: 63 IN | HEART RATE: 55 BPM

## 2024-11-15 DIAGNOSIS — Z12.31 ENCOUNTER FOR SCREENING MAMMOGRAM FOR MALIGNANT NEOPLASM OF BREAST: ICD-10-CM

## 2024-11-15 DIAGNOSIS — Z80.3 FAMILY HISTORY OF MALIGNANT NEOPLASM OF BREAST: ICD-10-CM

## 2024-11-15 DIAGNOSIS — R92.323 MAMMOGRAPHIC FIBROGLANDULAR DENSITY, BILATERAL BREASTS: ICD-10-CM

## 2024-11-15 DIAGNOSIS — N60.81 OTHER BENIGN MAMMARY DYSPLASIAS OF RIGHT BREAST: ICD-10-CM

## 2024-11-15 PROCEDURE — 99212 OFFICE O/P EST SF 10 MIN: CPT

## 2024-11-19 ENCOUNTER — LABORATORY RESULT (OUTPATIENT)
Age: 79
End: 2024-11-19

## 2024-12-09 ENCOUNTER — RESULT REVIEW (OUTPATIENT)
Age: 79
End: 2024-12-09

## 2024-12-11 ENCOUNTER — APPOINTMENT (OUTPATIENT)
Dept: NEUROLOGY | Facility: CLINIC | Age: 79
End: 2024-12-11
Payer: MEDICARE

## 2024-12-11 VITALS
OXYGEN SATURATION: 98 % | DIASTOLIC BLOOD PRESSURE: 71 MMHG | SYSTOLIC BLOOD PRESSURE: 119 MMHG | WEIGHT: 168 LBS | HEART RATE: 70 BPM | HEIGHT: 63 IN | BODY MASS INDEX: 29.77 KG/M2

## 2024-12-11 DIAGNOSIS — F03.A0 UNSPECIFIED DEMENTIA, MILD, WITHOUT BEHAVIORAL DISTURBANCE, PSYCHOTIC DISTURBANCE, MOOD DISTURBANCE, AND ANXIETY: ICD-10-CM

## 2024-12-11 PROCEDURE — 99214 OFFICE O/P EST MOD 30 MIN: CPT

## 2024-12-11 RX ORDER — DONEPEZIL HYDROCHLORIDE 5 MG/1
5 TABLET ORAL
Qty: 90 | Refills: 0 | Status: ACTIVE | COMMUNITY
Start: 2024-12-11 | End: 1900-01-01

## 2024-12-12 ENCOUNTER — RESULT REVIEW (OUTPATIENT)
Age: 79
End: 2024-12-12

## 2024-12-12 ENCOUNTER — APPOINTMENT (OUTPATIENT)
Dept: HEMATOLOGY ONCOLOGY | Facility: CLINIC | Age: 79
End: 2024-12-12
Payer: MEDICARE

## 2024-12-12 VITALS
HEIGHT: 63 IN | RESPIRATION RATE: 16 BRPM | SYSTOLIC BLOOD PRESSURE: 149 MMHG | BODY MASS INDEX: 30.48 KG/M2 | OXYGEN SATURATION: 98 % | TEMPERATURE: 97.9 F | WEIGHT: 172 LBS | DIASTOLIC BLOOD PRESSURE: 74 MMHG | HEART RATE: 59 BPM

## 2024-12-12 DIAGNOSIS — I10 ESSENTIAL (PRIMARY) HYPERTENSION: ICD-10-CM

## 2024-12-12 DIAGNOSIS — M81.0 AGE-RELATED OSTEOPOROSIS W/OUT CURRENT PATHOLOGICAL FRACTURE: ICD-10-CM

## 2024-12-12 DIAGNOSIS — D69.3 IMMUNE THROMBOCYTOPENIC PURPURA: ICD-10-CM

## 2024-12-12 DIAGNOSIS — F03.911 UNSPECIFIED DEMENTIA, UNSPECIFIED SEVERITY, WITH AGITATION: ICD-10-CM

## 2024-12-12 PROCEDURE — 36415 COLL VENOUS BLD VENIPUNCTURE: CPT

## 2024-12-12 PROCEDURE — 99213 OFFICE O/P EST LOW 20 MIN: CPT

## 2024-12-16 ENCOUNTER — RX RENEWAL (OUTPATIENT)
Age: 79
End: 2024-12-16

## 2025-01-02 ENCOUNTER — APPOINTMENT (OUTPATIENT)
Dept: ENDOCRINOLOGY | Facility: CLINIC | Age: 80
End: 2025-01-02

## 2025-02-06 NOTE — PHYSICAL EXAM
[Alert] : alert [No Acute Distress] : no acute distress [Well Nourished] : well nourished [Well Developed] : well developed [Normal Sclera/Conjunctiva] : normal sclera/conjunctiva [EOMI] : extra ocular movement intact [No Proptosis] : no proptosis [Normal Oropharynx] : the oropharynx was normal [Thyroid Not Enlarged] : the thyroid was not enlarged [No Thyroid Nodules] : there were no palpable thyroid nodules [No Respiratory Distress] : no respiratory distress No [No Accessory Muscle Use] : no accessory muscle use [Clear to Auscultation] : lungs were clear to auscultation bilaterally [Normal Rate] : heart rate was normal  [Normal S1, S2] : normal S1 and S2 [Regular Rhythm] : with a regular rhythm [Pedal Pulses Normal] : the pedal pulses are present [No Edema] : there was no peripheral edema [Normal Bowel Sounds] : normal bowel sounds [Not Tender] : non-tender [Soft] : abdomen soft [Not Distended] : not distended [Post Cervical Nodes] : posterior cervical nodes [Anterior Cervical Nodes] : anterior cervical nodes [Axillary Nodes] : axillary nodes [Normal] : normal and non tender [No Spinal Tenderness] : no spinal tenderness [Spine Straight] : spine straight [No Stigmata of Cushings Syndrome] : no stigmata of cushings syndrome [Normal Gait] : normal gait [Normal Strength/Tone] : muscle strength and tone were normal [No Rash] : no rash [Normal Reflexes] : deep tendon reflexes were 2+ and symmetric [No Tremors] : no tremors [Oriented x3] : oriented to person, place, and time [Acanthosis Nigricans] : no acanthosis nigricans

## 2025-02-07 ENCOUNTER — APPOINTMENT (OUTPATIENT)
Dept: ENDOCRINOLOGY | Facility: CLINIC | Age: 80
End: 2025-02-07
Payer: MEDICARE

## 2025-02-07 ENCOUNTER — NON-APPOINTMENT (OUTPATIENT)
Age: 80
End: 2025-02-07

## 2025-02-07 VITALS
OXYGEN SATURATION: 97 % | HEART RATE: 63 BPM | DIASTOLIC BLOOD PRESSURE: 62 MMHG | BODY MASS INDEX: 32.43 KG/M2 | HEIGHT: 63 IN | SYSTOLIC BLOOD PRESSURE: 114 MMHG | WEIGHT: 183 LBS

## 2025-02-07 DIAGNOSIS — E78.49 OTHER HYPERLIPIDEMIA: ICD-10-CM

## 2025-02-07 DIAGNOSIS — M81.0 AGE-RELATED OSTEOPOROSIS W/OUT CURRENT PATHOLOGICAL FRACTURE: ICD-10-CM

## 2025-02-07 DIAGNOSIS — E67.3 HYPERVITAMINOSIS D: ICD-10-CM

## 2025-02-07 DIAGNOSIS — E03.9 HYPOTHYROIDISM, UNSPECIFIED: ICD-10-CM

## 2025-02-07 DIAGNOSIS — E66.812 OBESITY, CLASS 2: ICD-10-CM

## 2025-02-07 DIAGNOSIS — N25.81 SECONDARY HYPERPARATHYROIDISM OF RENAL ORIGIN: ICD-10-CM

## 2025-02-07 DIAGNOSIS — E66.01 OBESITY, CLASS 2: ICD-10-CM

## 2025-02-07 DIAGNOSIS — E53.8 DEFICIENCY OF OTHER SPECIFIED B GROUP VITAMINS: ICD-10-CM

## 2025-02-07 PROCEDURE — 99215 OFFICE O/P EST HI 40 MIN: CPT

## 2025-02-07 PROCEDURE — G2211 COMPLEX E/M VISIT ADD ON: CPT

## 2025-02-20 NOTE — ASSESSMENT
[FreeTextEntry1] : 76 yo female referred by Dr. Jennifer Akins for evaluation of thrombocytopenia.  Labs were send for inflammatory parameters, nutritional deficiency, immunofixation, immunoglobulins. \par \par Patient has memory loss and doesn’t recall the details of her condition.\par She denies bleeding, fevers, night sweats, weight loss, recent infections or change in meds.\par I discussed with her  and she is drinking alcohol daily- both her and her spouse have memory impairment- sister comes to visit to provide hx- pt reports she has stopped drinking Ivette ( proxy)\par Patient has positive LUCAS in the past. \par \par # Breast biopsy- Flat epithelial atypia, Dr. Ramesh\par - continue surveillance.\par - mammogram due now \par \par # vit B12 borderline - start vit B12 \par \par #MELVINA- s/p IV iron, Ferritin 241\par \par # Thrombocytopenia - bone marrow biopsy, results from  reviewed - c/w ITP, d/w patient and family (  and sister). Reviewed presentation of thrombocytopenia, bleeding, petechiae.  Monitor q 3 months, treatment if platelets less then 30k.  Platelets 119 k- no evidence of bleeding \par \par # Memory loss- here with  \par \par FH \par Father-  MI, colon cancer,  multiple polyps, \par Paternal brother ( Robbie) - colon cancer\par 3 paternal aunts - no cancer\par Paternal GF \par Paternal GM - colon cancer\par Genetic testing MSH6 and TERT  VUS - referred to genetic counselor- patient and sister refused to see genetic counselor \par \par Labs ordered, drawn office- reviewed, \par \par \par RTC 4 months \par  stated

## 2025-02-21 ENCOUNTER — APPOINTMENT (OUTPATIENT)
Dept: GERIATRICS | Facility: CLINIC | Age: 80
End: 2025-02-21
Payer: MEDICARE

## 2025-02-21 VITALS
WEIGHT: 189 LBS | HEART RATE: 62 BPM | TEMPERATURE: 97 F | SYSTOLIC BLOOD PRESSURE: 116 MMHG | OXYGEN SATURATION: 97 % | DIASTOLIC BLOOD PRESSURE: 78 MMHG | HEIGHT: 61 IN | BODY MASS INDEX: 35.68 KG/M2

## 2025-02-21 DIAGNOSIS — R41.89 OTHER SYMPTOMS AND SIGNS INVOLVING COGNITIVE FUNCTIONS AND AWARENESS: ICD-10-CM

## 2025-02-21 DIAGNOSIS — R23.4 CHANGES IN SKIN TEXTURE: ICD-10-CM

## 2025-02-21 DIAGNOSIS — T14.8XXA OTHER INJURY OF UNSPECIFIED BODY REGION, INITIAL ENCOUNTER: ICD-10-CM

## 2025-02-21 DIAGNOSIS — F42.4 EXCORIATION (SKIN-PICKING) DISORDER: ICD-10-CM

## 2025-02-21 DIAGNOSIS — L28.2 OTHER PRURIGO: ICD-10-CM

## 2025-02-21 DIAGNOSIS — L85.3 XEROSIS CUTIS: ICD-10-CM

## 2025-02-21 DIAGNOSIS — R60.0 LOCALIZED EDEMA: ICD-10-CM

## 2025-02-21 PROCEDURE — 99214 OFFICE O/P EST MOD 30 MIN: CPT

## 2025-02-21 RX ORDER — AMMONIUM LACTATE 12 %
12 CREAM (GRAM) TOPICAL TWICE DAILY
Qty: 1 | Refills: 1 | Status: ACTIVE | COMMUNITY
Start: 2025-02-21 | End: 1900-01-01

## 2025-02-21 RX ORDER — HYDROCORTISONE 10 MG/G
1 CREAM TOPICAL TWICE DAILY
Qty: 1 | Refills: 0 | Status: ACTIVE | COMMUNITY
Start: 2025-02-21 | End: 1900-01-01

## 2025-02-23 PROBLEM — Z12.11 COLON CANCER SCREENING: Status: ACTIVE | Noted: 2025-02-23

## 2025-02-25 DIAGNOSIS — K21.9 GASTRO-ESOPHAGEAL REFLUX DISEASE W/OUT ESOPHAGITIS: ICD-10-CM

## 2025-02-25 RX ORDER — PANTOPRAZOLE 40 MG/1
40 TABLET, DELAYED RELEASE ORAL DAILY
Qty: 90 | Refills: 3 | Status: ACTIVE | COMMUNITY
Start: 2025-02-25 | End: 1900-01-01

## 2025-03-10 ENCOUNTER — NON-APPOINTMENT (OUTPATIENT)
Age: 80
End: 2025-03-10

## 2025-03-10 ENCOUNTER — APPOINTMENT (OUTPATIENT)
Dept: NEUROLOGY | Facility: CLINIC | Age: 80
End: 2025-03-10
Payer: MEDICARE

## 2025-03-10 VITALS
DIASTOLIC BLOOD PRESSURE: 72 MMHG | BODY MASS INDEX: 35.71 KG/M2 | SYSTOLIC BLOOD PRESSURE: 149 MMHG | HEART RATE: 58 BPM | OXYGEN SATURATION: 98 % | WEIGHT: 189 LBS

## 2025-03-10 DIAGNOSIS — R41.89 OTHER SYMPTOMS AND SIGNS INVOLVING COGNITIVE FUNCTIONS AND AWARENESS: ICD-10-CM

## 2025-03-10 DIAGNOSIS — F03.A0 UNSPECIFIED DEMENTIA, MILD, WITHOUT BEHAVIORAL DISTURBANCE, PSYCHOTIC DISTURBANCE, MOOD DISTURBANCE, AND ANXIETY: ICD-10-CM

## 2025-03-10 PROCEDURE — 99214 OFFICE O/P EST MOD 30 MIN: CPT

## 2025-04-01 ENCOUNTER — APPOINTMENT (OUTPATIENT)
Dept: GASTROENTEROLOGY | Facility: CLINIC | Age: 80
End: 2025-04-01
Payer: MEDICARE

## 2025-04-01 VITALS
WEIGHT: 189 LBS | BODY MASS INDEX: 35.68 KG/M2 | HEIGHT: 61 IN | SYSTOLIC BLOOD PRESSURE: 124 MMHG | DIASTOLIC BLOOD PRESSURE: 80 MMHG

## 2025-04-01 DIAGNOSIS — Z12.11 ENCOUNTER FOR SCREENING FOR MALIGNANT NEOPLASM OF COLON: ICD-10-CM

## 2025-04-01 DIAGNOSIS — D69.6 THROMBOCYTOPENIA, UNSPECIFIED: ICD-10-CM

## 2025-04-01 DIAGNOSIS — Z80.0 FAMILY HISTORY OF MALIGNANT NEOPLASM OF DIGESTIVE ORGANS: ICD-10-CM

## 2025-04-01 DIAGNOSIS — R41.89 OTHER SYMPTOMS AND SIGNS INVOLVING COGNITIVE FUNCTIONS AND AWARENESS: ICD-10-CM

## 2025-04-01 DIAGNOSIS — K21.9 GASTRO-ESOPHAGEAL REFLUX DISEASE W/OUT ESOPHAGITIS: ICD-10-CM

## 2025-04-01 PROCEDURE — 99203 OFFICE O/P NEW LOW 30 MIN: CPT

## 2025-04-01 RX ORDER — SODIUM PICOSULFATE, MAGNESIUM OXIDE, AND ANHYDROUS CITRIC ACID 12; 3.5; 1 G/175ML; G/175ML; MG/175ML
10-3.5-12 MG-GM LIQUID ORAL
Qty: 2 | Refills: 1 | Status: ACTIVE | COMMUNITY
Start: 2025-04-01 | End: 1900-01-01

## 2025-04-07 ENCOUNTER — APPOINTMENT (OUTPATIENT)
Dept: GERIATRICS | Facility: CLINIC | Age: 80
End: 2025-04-07

## 2025-04-22 NOTE — ASSESSMENT
[FreeTextEntry1] : 77 yo female referred by Dr. Jennifer Akins for evaluation of thrombocytopenia.\par \par Patient has memory loss and doesn’t recall the details of her condition.\par She denies bleeding, fevers, night sweats, weight loss, recent infections or change in meds.\par I discussed with her  and she is drinking alcohol daily. \par Patient has positive LUCAS in the past. \par Labs were send for inflammatory parameters, nutritional deficiency, immunofixation, immunoglobulins. \par \par Follow up shortly. \par \par 2021\par Patient here with  and sister Ivette ( proxy),  reviewed alcohol intake - both her and her  are drinking. Patient forgetful. \par s/p IV iron, \par Sister states patient and her  are in denial as far alcohol intake. \par Patient herself with memory loss.\par Platelets - 113k, improved. \par LUCAS - weak positive\par Derm evaluation done, scabs and rash LE - biopsy done, obtain results. \par vit B12 borderline - start vit B12 \par Suspected Trujillo syndrome in the family - [facility] ordered today\par Breast biopsy- Flat epithelial atypia, to see Dr. Reed.\par \par 2022\par FH \par Father-  MI, colon cancer,  multiple polyps, \par Paternal brother ( Robbie) - colon cancer\par 3 paternal aunts - no cancer\par Paternal GF \par Paternal GM - colon cancer\par Genetic testing MSH6 and TERT  VUS - referred to genetic counselor \par Due for colonoscopy\par Thrombocytopenia - resolution, since patient stopped drinking, plt today 193k. \par Noted increased in WBC 11.03 with ANC- 9.67, patient looks asymptomatic, not septic, will continue surveillance. POssible rebound after bone marrow recovery from ETOH suppression. \par RTC 3 months\par \par  Anterior Platysmal Bands Units: 0 Show Right And Left Pupillary Line Units: No Show Additional Area 4: Yes Consent: Written consent obtained. Risks include but not limited to lid/brow ptosis, bruising, swelling, diplopia, temporary effect, incomplete chemical denervation. Incrementing Botox Units: By 0.5 Units Dilution (U/0.1 Cc): 4 Post-Care Instructions: Patient instructed to not lie down for 4 hours and limit physical activity for 24 hours. Detail Level: Detailed Show Inventory Tab: Show

## 2025-05-07 ENCOUNTER — APPOINTMENT (OUTPATIENT)
Dept: ENDOCRINOLOGY | Facility: CLINIC | Age: 80
End: 2025-05-07
Payer: MEDICARE

## 2025-05-07 VITALS
WEIGHT: 192 LBS | HEIGHT: 61.25 IN | HEART RATE: 61 BPM | SYSTOLIC BLOOD PRESSURE: 126 MMHG | OXYGEN SATURATION: 99 % | BODY MASS INDEX: 35.79 KG/M2 | DIASTOLIC BLOOD PRESSURE: 58 MMHG

## 2025-05-07 DIAGNOSIS — E67.3 HYPERVITAMINOSIS D: ICD-10-CM

## 2025-05-07 DIAGNOSIS — E66.812 OBESITY, CLASS 2: ICD-10-CM

## 2025-05-07 DIAGNOSIS — E78.49 OTHER HYPERLIPIDEMIA: ICD-10-CM

## 2025-05-07 DIAGNOSIS — E03.9 HYPOTHYROIDISM, UNSPECIFIED: ICD-10-CM

## 2025-05-07 DIAGNOSIS — N25.81 SECONDARY HYPERPARATHYROIDISM OF RENAL ORIGIN: ICD-10-CM

## 2025-05-07 DIAGNOSIS — E66.01 OBESITY, CLASS 2: ICD-10-CM

## 2025-05-07 DIAGNOSIS — E53.8 DEFICIENCY OF OTHER SPECIFIED B GROUP VITAMINS: ICD-10-CM

## 2025-05-07 DIAGNOSIS — M81.0 AGE-RELATED OSTEOPOROSIS W/OUT CURRENT PATHOLOGICAL FRACTURE: ICD-10-CM

## 2025-05-07 PROCEDURE — 99215 OFFICE O/P EST HI 40 MIN: CPT

## 2025-05-07 PROCEDURE — G2211 COMPLEX E/M VISIT ADD ON: CPT

## 2025-05-30 ENCOUNTER — APPOINTMENT (OUTPATIENT)
Dept: GASTROENTEROLOGY | Facility: HOSPITAL | Age: 80
End: 2025-05-30

## 2025-05-30 ENCOUNTER — TRANSCRIPTION ENCOUNTER (OUTPATIENT)
Age: 80
End: 2025-05-30

## 2025-06-02 RX ORDER — DONEPEZIL HYDROCHLORIDE 10 MG/1
10 TABLET ORAL
Qty: 90 | Refills: 0 | Status: ACTIVE | COMMUNITY
Start: 2025-06-02 | End: 1900-01-01

## 2025-06-25 ENCOUNTER — RESULT REVIEW (OUTPATIENT)
Age: 80
End: 2025-06-25

## 2025-06-27 ENCOUNTER — NON-APPOINTMENT (OUTPATIENT)
Age: 80
End: 2025-06-27

## 2025-06-30 ENCOUNTER — APPOINTMENT (OUTPATIENT)
Dept: GERIATRICS | Facility: CLINIC | Age: 80
End: 2025-06-30
Payer: MEDICARE

## 2025-06-30 VITALS
TEMPERATURE: 97.6 F | DIASTOLIC BLOOD PRESSURE: 60 MMHG | BODY MASS INDEX: 34.3 KG/M2 | WEIGHT: 183 LBS | OXYGEN SATURATION: 99 % | HEART RATE: 69 BPM | SYSTOLIC BLOOD PRESSURE: 108 MMHG

## 2025-06-30 PROBLEM — N17.9 AKI (ACUTE KIDNEY INJURY): Status: ACTIVE | Noted: 2025-06-30

## 2025-06-30 PROBLEM — K57.90 DIVERTICULOSIS: Status: ACTIVE | Noted: 2025-06-30

## 2025-06-30 PROBLEM — E86.0 DEHYDRATION: Status: ACTIVE | Noted: 2025-06-30

## 2025-06-30 PROCEDURE — 99496 TRANSJ CARE MGMT HIGH F2F 7D: CPT

## 2025-07-01 LAB
ANION GAP SERPL CALC-SCNC: 12 MMOL/L
BASOPHILS # BLD AUTO: 0.03 K/UL
BASOPHILS NFR BLD AUTO: 0.5 %
BUN SERPL-MCNC: 11 MG/DL
CALCIUM SERPL-MCNC: 10 MG/DL
CHLORIDE SERPL-SCNC: 105 MMOL/L
CO2 SERPL-SCNC: 25 MMOL/L
CREAT SERPL-MCNC: 1.05 MG/DL
EGFRCR SERPLBLD CKD-EPI 2021: 53 ML/MIN/1.73M2
EOSINOPHIL # BLD AUTO: 0.05 K/UL
EOSINOPHIL NFR BLD AUTO: 0.8 %
GLUCOSE SERPL-MCNC: 89 MG/DL
HCT VFR BLD CALC: 39.8 %
HGB BLD-MCNC: 12.8 G/DL
IMM GRANULOCYTES NFR BLD AUTO: 0.8 %
LYMPHOCYTES # BLD AUTO: 1.05 K/UL
LYMPHOCYTES NFR BLD AUTO: 17.6 %
MAN DIFF?: NORMAL
MCHC RBC-ENTMCNC: 28.9 PG
MCHC RBC-ENTMCNC: 32.2 G/DL
MCV RBC AUTO: 89.8 FL
MONOCYTES # BLD AUTO: 0.56 K/UL
MONOCYTES NFR BLD AUTO: 9.4 %
NEUTROPHILS # BLD AUTO: 4.24 K/UL
NEUTROPHILS NFR BLD AUTO: 70.9 %
PLATELET # BLD AUTO: 188 K/UL
POTASSIUM SERPL-SCNC: 4.4 MMOL/L
RBC # BLD: 4.43 M/UL
RBC # FLD: 13.9 %
SODIUM SERPL-SCNC: 142 MMOL/L
WBC # FLD AUTO: 5.98 K/UL

## 2025-07-03 ENCOUNTER — RESULT REVIEW (OUTPATIENT)
Age: 80
End: 2025-07-03

## 2025-07-25 ENCOUNTER — APPOINTMENT (OUTPATIENT)
Dept: GERIATRICS | Facility: CLINIC | Age: 80
End: 2025-07-25
Payer: MEDICARE

## 2025-07-25 VITALS
HEIGHT: 61 IN | BODY MASS INDEX: 34.36 KG/M2 | SYSTOLIC BLOOD PRESSURE: 118 MMHG | DIASTOLIC BLOOD PRESSURE: 58 MMHG | OXYGEN SATURATION: 97 % | WEIGHT: 182 LBS | TEMPERATURE: 97.8 F | HEART RATE: 54 BPM

## 2025-07-25 DIAGNOSIS — F42.4 EXCORIATION (SKIN-PICKING) DISORDER: ICD-10-CM

## 2025-07-25 DIAGNOSIS — K21.9 GASTRO-ESOPHAGEAL REFLUX DISEASE W/OUT ESOPHAGITIS: ICD-10-CM

## 2025-07-25 DIAGNOSIS — M81.0 AGE-RELATED OSTEOPOROSIS W/OUT CURRENT PATHOLOGICAL FRACTURE: ICD-10-CM

## 2025-07-25 DIAGNOSIS — F03.A0 UNSPECIFIED DEMENTIA, MILD, WITHOUT BEHAVIORAL DISTURBANCE, PSYCHOTIC DISTURBANCE, MOOD DISTURBANCE, AND ANXIETY: ICD-10-CM

## 2025-07-25 PROCEDURE — G2211 COMPLEX E/M VISIT ADD ON: CPT

## 2025-07-25 PROCEDURE — 99215 OFFICE O/P EST HI 40 MIN: CPT

## 2025-07-25 RX ORDER — CALCIUM CARB/VIT D2/MINERALS
TABLET ORAL TWICE DAILY
Refills: 0 | Status: ACTIVE | COMMUNITY
Start: 2025-07-25

## 2025-08-29 ENCOUNTER — APPOINTMENT (OUTPATIENT)
Dept: ENDOCRINOLOGY | Facility: CLINIC | Age: 80
End: 2025-08-29
Payer: MEDICARE

## 2025-08-29 VITALS
HEIGHT: 61 IN | BODY MASS INDEX: 33.99 KG/M2 | SYSTOLIC BLOOD PRESSURE: 108 MMHG | WEIGHT: 180 LBS | DIASTOLIC BLOOD PRESSURE: 66 MMHG | HEART RATE: 66 BPM | OXYGEN SATURATION: 97 %

## 2025-08-29 DIAGNOSIS — E66.812 OBESITY, CLASS 2: ICD-10-CM

## 2025-08-29 DIAGNOSIS — E03.9 HYPOTHYROIDISM, UNSPECIFIED: ICD-10-CM

## 2025-08-29 DIAGNOSIS — N25.81 SECONDARY HYPERPARATHYROIDISM OF RENAL ORIGIN: ICD-10-CM

## 2025-08-29 DIAGNOSIS — E67.3 HYPERVITAMINOSIS D: ICD-10-CM

## 2025-08-29 DIAGNOSIS — E78.49 OTHER HYPERLIPIDEMIA: ICD-10-CM

## 2025-08-29 DIAGNOSIS — M81.0 AGE-RELATED OSTEOPOROSIS W/OUT CURRENT PATHOLOGICAL FRACTURE: ICD-10-CM

## 2025-08-29 DIAGNOSIS — E66.01 OBESITY, CLASS 2: ICD-10-CM

## 2025-08-29 DIAGNOSIS — E53.8 DEFICIENCY OF OTHER SPECIFIED B GROUP VITAMINS: ICD-10-CM

## 2025-08-29 PROCEDURE — 99215 OFFICE O/P EST HI 40 MIN: CPT

## 2025-08-29 PROCEDURE — G2211 COMPLEX E/M VISIT ADD ON: CPT

## 2025-09-02 ENCOUNTER — RX RENEWAL (OUTPATIENT)
Age: 80
End: 2025-09-02

## 2025-09-02 DIAGNOSIS — T14.8XXA OTHER INJURY OF UNSPECIFIED BODY REGION, INITIAL ENCOUNTER: ICD-10-CM

## 2025-09-10 ENCOUNTER — APPOINTMENT (OUTPATIENT)
Dept: NEUROLOGY | Facility: CLINIC | Age: 80
End: 2025-09-10
Payer: MEDICARE

## 2025-09-10 VITALS
DIASTOLIC BLOOD PRESSURE: 68 MMHG | BODY MASS INDEX: 34.58 KG/M2 | SYSTOLIC BLOOD PRESSURE: 107 MMHG | OXYGEN SATURATION: 99 % | WEIGHT: 183 LBS | HEART RATE: 53 BPM

## 2025-09-10 PROCEDURE — 99213 OFFICE O/P EST LOW 20 MIN: CPT
